# Patient Record
Sex: MALE | Race: WHITE | NOT HISPANIC OR LATINO | Employment: FULL TIME | URBAN - METROPOLITAN AREA
[De-identification: names, ages, dates, MRNs, and addresses within clinical notes are randomized per-mention and may not be internally consistent; named-entity substitution may affect disease eponyms.]

---

## 2020-10-01 ENCOUNTER — APPOINTMENT (OUTPATIENT)
Dept: RADIOLOGY | Facility: MEDICAL CENTER | Age: 50
End: 2020-10-01
Attending: EMERGENCY MEDICINE
Payer: COMMERCIAL

## 2020-10-01 ENCOUNTER — HOSPITAL ENCOUNTER (EMERGENCY)
Facility: MEDICAL CENTER | Age: 50
End: 2020-10-01
Attending: EMERGENCY MEDICINE
Payer: COMMERCIAL

## 2020-10-01 VITALS
BODY MASS INDEX: 23.4 KG/M2 | DIASTOLIC BLOOD PRESSURE: 85 MMHG | HEART RATE: 59 BPM | HEIGHT: 71 IN | TEMPERATURE: 99.1 F | OXYGEN SATURATION: 97 % | SYSTOLIC BLOOD PRESSURE: 134 MMHG | RESPIRATION RATE: 18 BRPM | WEIGHT: 167.11 LBS

## 2020-10-01 DIAGNOSIS — Z77.098 EXPOSURE TO CHEMICAL INHALATION: ICD-10-CM

## 2020-10-01 PROCEDURE — 71045 X-RAY EXAM CHEST 1 VIEW: CPT

## 2020-10-01 PROCEDURE — 99283 EMERGENCY DEPT VISIT LOW MDM: CPT

## 2020-10-01 NOTE — ED TRIAGE NOTES
Chief Complaint   Patient presents with   • Chemical Exposure     Pt reports to have been working [facility], chemicals were used inside of [employer] for cleaning and pt had exposure to chemicals used on roof through ventilations.    • Joint Pain     Pt/staff masked and in appropriate PPE during encounter.

## 2020-10-01 NOTE — ED PROVIDER NOTES
"ED Provider Note    Scribed for Lily Haque M.D. by Juan Gongora. 10/1/2020, 12:11 PM.    This patient was cared for during the COVID-19 pandemic. History and physical exam may be limited/truncated by the inherent challenges of PPE and the need to decrease staff exposure to novel coronavirus. Some aspects of disease management may be different to protect staff and help slow the spread of disease. I verified that, if possible, the patient was wearing a mask and I was wearing appropriate PPE every time I encountered the patient.     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    • Joint Pain       HPI  Moisés Cardenas is a 50 y.o. male who presents to the Emergency Department with mild left hip pain and possible chemical exposure that occurred 2 days ago. 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 While working, he noticed that members of his crew were becoming dizzy due to the chemicals being used by the cleaning team; he is not sure what the composition of the chemical was. The patient reports additional symptoms of a splitting headache and thick \"caramel colored\" nasal discharge, and denies skin rash. No exacerbating or alleviating factors were noted. He also denies history of hip problems, lung problems, or history of asthma. He does smoke.  Denies any current difficulty breathing or productive cough.  He denies any nausea or vomiting.  He denies any trouble moving his arms or legs.  He states several joints hurt but is not having any difficulty ambulating.  He denies any " "numbness or weakness down his left leg.  He has not had any trauma to his left hip.  Denies any back pain.    REVIEW OF SYSTEMS  Pertinent positives include mild left hip pain, chemical exposure, headache, nasal discharge. Pertinent negatives include no skin rash, numbness or tingling to his arms or legs, shortness of breath, hemoptysis, dizziness, syncope, falls.    PAST MEDICAL HISTORY   None pertinent    SURGICAL HISTORY  patient denies any surgical history    SOCIAL HISTORY  Social History     Tobacco Use   • Smoking status: Current Every Day Smoker     Packs/day: 0.25   • Smokeless tobacco: Never Used   Substance Use Topics   • Alcohol use: Yes     Comment: occas   • Drug use: Yes     Types: Inhaled      Social History     Substance and Sexual Activity   Drug Use Yes   • Types: Inhaled       FAMILY HISTORY  History reviewed. No pertinent family history.    CURRENT MEDICATIONS  No current outpatient medications    ALLERGIES  No Known Allergies    PHYSICAL EXAM  VITAL SIGNS: /85   Pulse 68   Temp 37.3 °C (99.1 °F) (Temporal)   Resp 18   Ht 1.803 m (5' 11\")   Wt 75.8 kg (167 lb 1.7 oz)   SpO2 93%   BMI 23.31 kg/m²     Constitutional:  Alert in no apparent distress.  HENT: Normocephalic, Bilateral external ears normal. Nose normal.   Eyes: Pupils are equal and reactive. Conjunctiva normal, non-icteric.   Thorax & Lungs: Easy unlabored respirations, clear to auscultation, no wheezing  Cardiovascular: Regular rate and rhythm  Abdomen:  No gross signs of peritonitis, no pain with movement   Skin: Visualized skin is  Dry, No erythema, No rash.   Extremities:   No edema, No asymmetry, normal range of motion of all joints  Neurologic: Alert, Grossly non-focal.   Psychiatric: Affect and Mood normal      DIAGNOSTIC STUDIES / PROCEDURES    RADIOLOGY  DX-CHEST-PORTABLE (1 VIEW)   Final Result      1.  There is no acute cardiopulmonary process.        The radiologist's interpretation of all radiological studies " have been reviewed by me.    COURSE & MEDICAL DECISION MAKING  Nursing notes, VS, PMSFHx reviewed in chart.    11:35 AM - Patient seen and examined at bedside. I told the patient I wanted to order a chest x-ray to check his lungs. I recommended that he not return to work until his symptoms subside.  Concerning his hip pain he has normal range of motion.  He is able to weight-bear without difficulty.  I do not suspect infection at this time.  He does not have any focal findings on neurologic exam.  He has good strength and sensation.  I don't feel he needs any imaging of this hip at this time.  Patient verbalizes understanding and agreement to this plan of care. Ordered DX-chest to evaluate his symptoms.     X-ray is returned and is unremarkable.  He is resting comfortably here.  See form has been filled out and he is cleared to return to work once his symptoms have resolved.    The patient will return for new or worsening symptoms and is stable at the time of discharge.    The patient is referred to a primary physician for blood pressure management, diabetic screening, and for all other preventative health concerns.      DISPOSITION:  Patient will be discharged home in stable condition.    FOLLOW UP:  [address]  [address]  [facility] [id number] [phone]  Schedule an appointment as soon as possible for a visit   If symptoms worsen, return to the er.        FINAL IMPRESSION  1. Exposure to chemical inhalation          Juan FLORES ([place]), am scribing for, and in the presence of, Lily Haque M.D..    Electronically signed by: Juan Gongora ([place]), 10/1/2020    Lily FLORES M.D. personally performed the services described in this documentation, as scribed by Juan Gongora in my presence, and it is both accurate and complete.    E    The note accurately reflects work and decisions made by me.  Lily Haque M.D.  10/1/2020  3:21 PM

## 2020-10-01 NOTE — DISCHARGE INSTRUCTIONS
Go home and rest.  Take Tylenol or ibuprofen for your joint pain.  Do not return until your symptoms have resolved.

## 2020-10-01 NOTE — LETTER
"  FORM C-4:  EMPLOYEE’S CLAIM FOR COMPENSATION/ REPORT OF INITIAL TREATMENT  EMPLOYEE’S CLAIM - PROVIDE ALL INFORMATION REQUESTED   First Name Moisés Last Name Ronald Birthdate 1970  Sex male Claim Number   Home Address [address]             [place] [address]                                   Age  50 y.o. Height  1.803 m (5' 11\") Weight  75.8 kg (167 lb 1.7 oz) [facility]     Mailing Address [address]              Zip [address] Telephone  [phone] (home)  Primary Language Spoken  English   Insurer   Third Party   [employer] [facility] Employee's Occupation (Job Title) When Injury or Occupational Disease Occurred     Employer's Name [employer] Telephone [phone]    Employer Address [address] [place] [address]   Date of Injury  9/29/2020       Hour of Injury  8:00 AM Date Employer Notified  9/29/2020 Last Day of Work after Injury or Occupational Disease  9/30/2020 Supervisor 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 (if applicable) Tearing of the roof    How did this injury or occupational disease occur? Be specific and answer in detail. 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 I do not know what they  used and they used no ventilation so all the vapors came up throug the roof vents  and gased us for hours before we realized they were there   If you believe that you have an occupational disease, when did you first have knowledge of the disability and it relationship to your employment? N/A Witnesses to the Accident  Marc Haider Trever, [place], Ron Schneider   Nature of Injury or Occupational Disease  Workers' Compensation " Part(s) of Body Injured or Affected  Skull, Abdomen Including Groin, Chest    I CERTIFY THAT THE ABOVE IS TRUE AND CORRECT TO THE BEST OF MY KNOWLEDGE AND THAT I HAVE PROVIDED THIS INFORMATION IN ORDER TO OBTAIN THE BENEFITS OF [place]  I HEREBY AUTHORIZE ANY PHYSICIAN, CHIROPRACTOR, SURGEON, PRACTITIONER, OR OTHER PERSON, ANY HOSPITAL, INCLUDING [facility] OR [facility], ANY MEDICAL SERVICE ORGANIZATION, ANY INSURANCE COMPANY, OR OTHER INSTITUTION OR ORGANIZATION TO RELEASE TO EACH OTHER, ANY MEDICAL OR OTHER INFORMATION, INCLUDING BENEFITS PAID OR PAYABLE, PERTINENT TO THIS INJURY OR DISEASE, EXCEPT INFORMATION RELATIVE TO DIAGNOSIS, TREATMENT AND/OR COUNSELING FOR AIDS, PSYCHOLOGICAL CONDITIONS, ALCOHOL OR CONTROLLED SUBSTANCES, FOR WHICH I MUST GIVE SPECIFIC AUTHORIZATION.  A PHOTOSTAT OF THIS AUTHORIZATION SHALL BE AS VALID AS THE ORIGINAL.  Date      10/01/2020                     Place           [facility]                                       Employee’s Signature   THIS REPORT MUST BE COMPLETED AND MAILED WITHIN 3 WORKING DAYS OF TREATMENT   Place [facility], EMERGENCY DEPT                       Name of Facility [facility]   Date  10/1/2020 Diagnosis  (Z77.098) Exposure to chemical inhalation Is there evidence the injured employee was under the influence of alcohol and/or another controlled substance at the time of accident?   Hour  1:12 PM Description of Injury or Disease  Exposure to chemical inhalation No   Treatment  rest  Have you advised the patient to remain off work five days or more?         No   X-Ray Findings  Negative If Yes   From Date    To Date      From information given by the employee, together with medical evidence, can you directly connect this injury or occupational disease as job incurred? Yes If No, is employee capable of:  "Full Duty  Yes Modified Duty      Is additional medical care by a physician indicated? Yes If Modified Duty, Specify any Limitations / Restrictions       Do you know of any previous injury or disease contributing to this condition or occupational disease? No    Date 10/1/2020 Print Doctor’s Name Lily Haque certify the employer’s copy of this form was mailed on:   Address [address] [place] [id number] [phone] INSURER’S USE ONLY   Provider’s Tax ID Number [id number] Telephone Dept: [phone]    Doctor’s Signature emmanuel-LILY Jimenez M.D. Degree M.D.      Form C-4 (rev.10/07)                                                                         BRIEF DESCRIPTION OF RIGHTS AND BENEFITS  (Pursuant to [place] 616[place].050)    Notice of Injury or Occupational Disease (Incident Report Form C-1): If an injury or occupational disease (OD) arises out of and in the course of employment, you must provide written notice to your employer as soon as practicable, but no later than 7 days after the accident or OD. Your employer shall maintain a sufficient supply of the required forms.    Claim for Compensation (Form C-4): If medical treatment is sought, the form C-4 is available at the place of initial treatment. A completed \"Claim for Compensation\" (Form C-4) must be filed within 90 days after an accident or OD. The treating physician or chiropractor must, within 3 working days after treatment, complete and mail to the employer, the employer's insurer and third-party , the Claim for Compensation.    Medical Treatment: If you require medical treatment for your on-the-job injury or OD, you may be required to select a physician or chiropractor from a list provided by your workers’ compensation insurer, if it has contracted with an Organization for Managed Care (MCO) or Preferred Provider Organization (PPO) or providers of health care. If your employer has not entered into a contract with an MCO or PPO, you may " select a physician or chiropractor from the Panel of Physicians and Chiropractors. Any medical costs related to your industrial injury or OD will be paid by your insurer.    Temporary Total Disability (TTD): If your doctor has certified that you are unable to work for a period of at least 5 consecutive days, or 5 cumulative days in a 20-day period, or places restrictions on you that your employer does not accommodate, you may be entitled to TTD compensation.    Temporary Partial Disability (TPD): If the wage you receive upon reemployment is less than the compensation for TTD to which you are entitled, the insurer may be required to pay you TPD compensation to make up the difference. TPD can only be paid for a maximum of 24 months.    Permanent Partial Disability (PPD): When your medical condition is stable and there is an indication of a PPD as a result of your injury or OD, within 30 days, your insurer must arrange for an evaluation by a rating physician or chiropractor to determine the degree of your PPD. The amount of your PPD award depends on the date of injury, the results of the PPD evaluation and your age and wage.    Permanent Total Disability (PTD): If you are medically certified by a treating physician or chiropractor as permanently and totally disabled and have been granted a PTD status by your insurer, you are entitled to receive monthly benefits not to exceed 66 2/3% of your average monthly wage. The amount of your PTD payments is subject to reduction if you previously received a PPD award.    Vocational Rehabilitation Services: You may be eligible for vocational rehabilitation services if you are unable to return to the job due to a permanent physical impairment or permanent restrictions as a result of your injury or occupational disease.    Transportation and Per Theodore Reimbursement: You may be eligible for travel expenses and per theodore associated with medical treatment.    Reopening: You may be able to  reopen your claim if your condition worsens after claim closure.     Appeal Process: If you disagree with a written determination issued by the insurer or the insurer does not respond to your request, you may appeal to the Department of Administration, , by following the instructions contained in your determination letter. You must appeal the determination within 70 days from the date of the determination letter at [address], [address], [place], [place] [address], or [address], [address], [place], [place] [address]. If you disagree with the  decision, you may appeal to the Department of Administration, . You must file your appeal within 30 days from the date of the  decision letter at [address], [address], [place], [place] [address], or [address] [facility] [address], [place], [place] [address]. If you disagree with a decision of an , you may file a petition for judicial review with the District Court. You must do so within 30 days of the Appeal Officer’s decision. You may be represented by an  at your own expense or you may contact the [facility] for possible representation.    [place]  for Injured Workers (NAIW): If you disagree with a  decision, you may request that NAIW represent you without charge at an  Hearing. For information regarding denial of benefits, you may contact the [facility] at: [address], [address], [place] [address], [phone], or [address], [place] [address], [phone]    To File a Complaint with the Division: If you wish to file a complaint with the  of the Division of Industrial Relations (DIR),  please contact the Workers’ Compensation Section, [address], [address], [place], [place] [address], telephone [phone]) [phone], or [address], [address], [place], [place] [address],  telephone [phone].    For assistance with Workers’ Compensation Issues: You may contact the Office of the Governor Consumer Health Assistance, [address] [place] [address], [address], [place] [address], Toll Free 1-559.279.5234, Web site: http://[employer].com.[facility].nv., E-mail [email or web address].  D-2 (rev. 06/18)              __________________________________________________________________                                    ___10/01/2020______________            Employee Name / Signature                                                                                                                            Date

## 2020-10-01 NOTE — LETTER
"  FORM C-4:  EMPLOYEE’S CLAIM FOR COMPENSATION/ REPORT OF INITIAL TREATMENT  EMPLOYEE’S CLAIM - PROVIDE ALL INFORMATION REQUESTED   First Name Moisés Last Name Ronald Birthdate 1970  Sex male Claim Number   Home Address [address] [facility]             Zip [address]                                   Age  50 y.o. Height  1.803 m (5' 11\") Weight  75.8 kg (167 lb 1.7 oz) [facility]  [id number]   Mailing Address [address]  [facility]              Zip [address] Telephone  [phone] (home)  Primary Language Spoken   Insurer  *** Third Party   [employer] Employee's Occupation (Job Title) When Injury or Occupational Disease Occurred     Employer's Name  Telephone     Employer Address  City  State  Zip    Date of Injury  9/29/2020       Hour of Injury  8:00 AM Date Employer Notified  9/29/2020 Last Day of Work after Injury or Occupational Disease  9/30/2020 Supervisor to Whom Injury Reported  Logan Purvis   Address or Location of Accident (if applicable) [[facility]]   What were you doing at the time of accident? (if applicable) Tearing of the roof    How did this injury or occupational disease occur? Be specific and answer in detail. 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 I do not know what they  used and they used no ventilation so all the vapors came up throug the roof vents  and gased us for hours before we realized they were there   If you believe that you have an occupational disease, when did you first have knowledge of the disability and it relationship to your employment? N/A Witnesses to the Accident  Marc Haider Trever, [place], Ron Schneider   Nature of Injury or Occupational Disease  Workers' Compensation Part(s) of Body Injured or Affected  Skull, Abdomen Including Groin, Chest    "   I CERTIFY THAT THE ABOVE IS TRUE AND CORRECT TO THE BEST OF MY KNOWLEDGE AND THAT I HAVE PROVIDED THIS INFORMATION IN ORDER TO OBTAIN THE BENEFITS OF [place]).  I HEREBY AUTHORIZE ANY PHYSICIAN, CHIROPRACTOR, SURGEON, PRACTITIONER, OR OTHER PERSON, ANY HOSPITAL, INCLUDING [facility] OR [facility], ANY MEDICAL SERVICE ORGANIZATION, ANY INSURANCE COMPANY, OR OTHER INSTITUTION OR ORGANIZATION TO RELEASE TO EACH OTHER, ANY MEDICAL OR OTHER INFORMATION, INCLUDING BENEFITS PAID OR PAYABLE, PERTINENT TO THIS INJURY OR DISEASE, EXCEPT INFORMATION RELATIVE TO DIAGNOSIS, TREATMENT AND/OR COUNSELING FOR AIDS, PSYCHOLOGICAL CONDITIONS, ALCOHOL OR CONTROLLED SUBSTANCES, FOR WHICH I MUST GIVE SPECIFIC AUTHORIZATION.  A PHOTOSTAT OF THIS AUTHORIZATION SHALL BE AS VALID AS THE ORIGINAL.  Date                                      Place                                                                             Employee’s Signature   THIS REPORT MUST BE COMPLETED AND MAILED WITHIN 3 WORKING DAYS OF TREATMENT   [facility], EMERGENCY DEPT                       [facility]   Date  10/1/2020 Diagnosis  (Z77.098) Exposure to chemical inhalation Is there evidence the injured employee was under the influence of alcohol and/or another controlled substance at the time of accident?   Hour  1:11 PM Description of Injury or Disease  Exposure to chemical inhalation No   Treatment  rest  Have you advised the patient to remain off work five days or more?         No   X-Ray Findings  Negative If Yes   From Date    To Date      From information given by the employee, together with medical evidence, can you directly connect this injury or occupational disease as job incurred? Yes If No, is employee capable of: Full Duty  Yes Modified Duty      Is additional medical  "care by a physician indicated? Yes If Modified Duty, Specify any Limitations / Restrictions       Do you know of any previous injury or disease contributing to this condition or occupational disease? No    Date 10/1/2020 Print Doctor’s Name HaqueLily troncoso SANDRA certify the employer’s copy of this form was mailed on:   Address [address] [place] [address]  [place] [id number] [phone] INSURER’S USE ONLY   Provider’s Tax ID Number [id number] Telephone Dept: [phone]    Doctor’s Signature LILY Cain M.D. Degree        Form C-4 (rev.10/07)                                                                         BRIEF DESCRIPTION OF RIGHTS AND BENEFITS  (Pursuant to [place] 616C.050)    Notice of Injury or Occupational Disease (Incident Report Form C-1): If an injury or occupational disease (OD) arises out of and in the course of employment, you must provide written notice to your employer as soon as practicable, but no later than 7 days after the accident or OD. Your employer shall maintain a sufficient supply of the required forms.    Claim for Compensation (Form C-4): If medical treatment is sought, the form C-4 is available at the place of initial treatment. A completed \"Claim for Compensation\" (Form C-4) must be filed within 90 days after an accident or OD. The treating physician or chiropractor must, within 3 working days after treatment, complete and mail to the employer, the employer's insurer and third-party , the Claim for Compensation.    Medical Treatment: If you require medical treatment for your on-the-job injury or OD, you may be required to select a physician or chiropractor from a list provided by your workers’ compensation insurer, if it has contracted with an Organization for Managed Care (MCO) or Preferred Provider Organization (PPO) or providers of health care. If your employer has not entered into a contract with an MCO or PPO, you may select a physician or chiropractor from the Panel of " Physicians and Chiropractors. Any medical costs related to your industrial injury or OD will be paid by your insurer.    Temporary Total Disability (TTD): If your doctor has certified that you are unable to work for a period of at least 5 consecutive days, or 5 cumulative days in a 20-day period, or places restrictions on you that your employer does not accommodate, you may be entitled to TTD compensation.    Temporary Partial Disability (TPD): If the wage you receive upon reemployment is less than the compensation for TTD to which you are entitled, the insurer may be required to pay you TPD compensation to make up the difference. TPD can only be paid for a maximum of 24 months.    Permanent Partial Disability (PPD): When your medical condition is stable and there is an indication of a PPD as a result of your injury or OD, within 30 days, your insurer must arrange for an evaluation by a rating physician or chiropractor to determine the degree of your PPD. The amount of your PPD award depends on the date of injury, the results of the PPD evaluation and your age and wage.    Permanent Total Disability (PTD): If you are medically certified by a treating physician or chiropractor as permanently and totally disabled and have been granted a PTD status by your insurer, you are entitled to receive monthly benefits not to exceed 66 2/3% of your average monthly wage. The amount of your PTD payments is subject to reduction if you previously received a PPD award.    Vocational Rehabilitation Services: You may be eligible for vocational rehabilitation services if you are unable to return to the job due to a permanent physical impairment or permanent restrictions as a result of your injury or occupational disease.    Transportation and Per Theodore Reimbursement: You may be eligible for travel expenses and per theodore associated with medical treatment.    Reopening: You may be able to reopen your claim if your condition worsens after  claim closure.     Appeal Process: If you disagree with a written determination issued by the insurer or the insurer does not respond to your request, you may appeal to the Department of Administration, , by following the instructions contained in your determination letter. You must appeal the determination within 70 days from the date of the determination letter at [address], [address], [place], [place] [address], or [address] [facility] [address], [address], [place], [place] [address]. If you disagree with the  decision, you may appeal to the Department of Administration, . You must file your appeal within 30 days from the date of the  decision letter at [address], [address], [place], [place] [address], or [address] [facility] [facility] [facility] [address], [place], [place] [address]. If you disagree with a decision of an , you may file a petition for judicial review with the District Court. You must do so within 30 days of the Appeal Officer’s decision. You may be represented by an  at your own expense or you may contact the [facility] for possible representation.    [place]  for Injured Workers (NAIW): If you disagree with a  decision, you may request that NAIW represent you without charge at an  Hearing. For information regarding denial of benefits, you may contact the [facility] at: [address], [address], [place] [address], [phone], or [address] [facility] [address] [facility] [address], [place] [address], [phone]    To File a Complaint with the Division: If you wish to file a complaint with the  of the Division of Industrial Relations (DIR),  please contact the Workers’ Compensation Section, [address] [facility], [place], [place] [address], telephone [phone], or [address], [place], [place] [address], telephone [phone].    For assistance with  Workers’ Compensation Issues: You may contact the Office of the Governor Consumer Health Assistance, [address] [place] [address], [facility] [address], [place] [address], Toll Free 1-695.666.4648, Web site: http://[email or web address]., E-mail [email or web address].  D-2 (rev. 06/18)              __________________________________________________________________                                    _________________            Employee Name / Signature                                                                                                                            Date

## 2022-08-18 ENCOUNTER — HOSPITAL ENCOUNTER (EMERGENCY)
Facility: MEDICAL CENTER | Age: 52
End: 2022-08-19
Attending: STUDENT IN AN ORGANIZED HEALTH CARE EDUCATION/TRAINING PROGRAM
Payer: COMMERCIAL

## 2022-08-18 ENCOUNTER — APPOINTMENT (OUTPATIENT)
Dept: RADIOLOGY | Facility: MEDICAL CENTER | Age: 52
End: 2022-08-18
Attending: STUDENT IN AN ORGANIZED HEALTH CARE EDUCATION/TRAINING PROGRAM
Payer: COMMERCIAL

## 2022-08-18 DIAGNOSIS — M54.2 NECK PAIN: ICD-10-CM

## 2022-08-18 DIAGNOSIS — R20.2 PARESTHESIA: ICD-10-CM

## 2022-08-18 DIAGNOSIS — M54.12 CERVICAL RADICULOPATHY: ICD-10-CM

## 2022-08-18 PROCEDURE — 700111 HCHG RX REV CODE 636 W/ 250 OVERRIDE (IP): Performed by: STUDENT IN AN ORGANIZED HEALTH CARE EDUCATION/TRAINING PROGRAM

## 2022-08-18 PROCEDURE — A9576 INJ PROHANCE MULTIPACK: HCPCS | Performed by: STUDENT IN AN ORGANIZED HEALTH CARE EDUCATION/TRAINING PROGRAM

## 2022-08-18 PROCEDURE — 700117 HCHG RX CONTRAST REV CODE 255: Performed by: STUDENT IN AN ORGANIZED HEALTH CARE EDUCATION/TRAINING PROGRAM

## 2022-08-18 PROCEDURE — 72156 MRI NECK SPINE W/O & W/DYE: CPT

## 2022-08-18 PROCEDURE — 96374 THER/PROPH/DIAG INJ IV PUSH: CPT

## 2022-08-18 PROCEDURE — 99284 EMERGENCY DEPT VISIT MOD MDM: CPT

## 2022-08-18 RX ORDER — LORAZEPAM 2 MG/ML
1 INJECTION INTRAMUSCULAR ONCE
Status: COMPLETED | OUTPATIENT
Start: 2022-08-18 | End: 2022-08-18

## 2022-08-18 RX ADMIN — LORAZEPAM 1 MG: 2 INJECTION INTRAMUSCULAR; INTRAVENOUS at 22:01

## 2022-08-18 RX ADMIN — GADOTERIDOL 15 ML: 279.3 INJECTION, SOLUTION INTRAVENOUS at 22:44

## 2022-08-19 VITALS
SYSTOLIC BLOOD PRESSURE: 123 MMHG | RESPIRATION RATE: 18 BRPM | WEIGHT: 158.95 LBS | OXYGEN SATURATION: 92 % | HEIGHT: 71 IN | TEMPERATURE: 97.8 F | DIASTOLIC BLOOD PRESSURE: 78 MMHG | HEART RATE: 61 BPM | BODY MASS INDEX: 22.25 KG/M2

## 2022-08-19 NOTE — ED TRIAGE NOTES
Moisés Cardenas  52 y.o. male  Chief Complaint   Patient presents with    Neck Pain     Pt was seen by Dr Tariq had an injection done to his neck today. Now has pain, numbness and increased pressure on the back of his neck and head     Patient states he has had extensive cervical surgeries, had a neck injection today and now he has pressure to back of his skull, numbness when he turns and looks left. Pt was unable to get hold of Dr Tariq office to inform them of the complications.     Vitals:    08/18/22 1818   BP: (!) 130/90   Pulse: 93   Resp: 18   Temp: 37.1 °C (98.7 °F)   SpO2: 98%       Triage process explained to patient, apologized for wait time, and returned to lobby.  Pt informed to notify staff of any change in condition.

## 2022-08-19 NOTE — DISCHARGE INSTRUCTIONS
Your MRI looked normal. Please call the office of Dr. Tariq tomorrow to obtain follow-up.  Return to the ER for weakness, numbness, fevers, loss of bladder or bowel control or numbness in the groin.     For pain, take Ibuprofen (Motrin, Advil) 600 mg up to every six hours  mg up to every eight hours if your stomach can take it. Instead of Ibuprofen, you can choose Naproxen (Aleve) and take 250-500 mg twice daily. Take Ibuprofen or Naproxen with food to lessen stomach irritation. You may also take Acetaminophen (Tylenol) 500mg (may take up to 1gm) every six hours in addition to Ibuprofen or Naproxen. (Maximum Tylenol 4 gm/day).  I suggest alternating between the ibuprofen and Tylenol every 4 hours for optimal pain relief and adequate spacing of each medication.

## 2022-08-19 NOTE — ED PROVIDER NOTES
"CHIEF COMPLAINT  Chief Complaint   Patient presents with    Neck Pain     Pt was seen by Dr Tariq had an injection done to his neck today. Now has pain, numbness and increased pressure on the back of his neck and head       HPI  Moisés Cardenas is a 52 y.o. male who presents for evaluation of moderate to severe neck pain onset today. Moisés has previously had cervical fixation about 10 years ago. Over the past four years he developed worsening neck pain and left arm paresthesias. Earlier today, Moisés was seen by Dr. Tariq and received an injection in his neck for his chronic pain. While he was getting the injection he states that it was \"extremely painful\". He does not know the nature of the injection. This was the first time he ever received an injection there. Around 4:30 PM Moisés develop a moderate burning pain that radiates down his left neck. He states his pain was about a 8/10.  Associated paresthesias are worse looking straight forward or turning to his left. He has a pressure on the top his head that radiates down his neck, but he denies any headaches. He has associated symptoms of mild photophobia and paresthesia down his left arm. Right now his numbness and pain is about a 3/10 in severity. He denies bowel or urine incontinence, pain or numbness in his groin, nausea, vomiting, fever, or chills. No alleviating factors were reported. He admits to smoking but denies alcohol or drug use.     Pain: severe  Location: left lateral neck  Quality: burning, throbbing sensation  Exacerbating or alleviating factors: exacerbated when looking to the left, no alleviating factors  Associated symptoms: paresthesia down his left arm  The patient states that there were no other associated signs and symptoms or modifying factors.    REVIEW OF SYSTEMS  As per HPI, otherwise a 10 point review of systems is negative    PAST MEDICAL HISTORY  Chronic neck pain    SOCIAL HISTORY  Social History     Tobacco Use    Smoking status: Every " "Day     Packs/day: 0.25     Types: Cigarettes    Smokeless tobacco: Never   Substance Use Topics    Alcohol use: Yes     Comment: occas    Drug use: Yes     Types: Inhaled       SURGICAL HISTORY  Unspecified neck surgery    CURRENT MEDICATIONS  Home Medications       Reviewed by Brittanie Chandler R.N. (Registered Nurse) on 08/18/22 at 1832  Med List Status: Not Addressed     Medication Last Dose Status        Patient Bakari Taking any Medications                           ALLERGIES  No Known Allergies    PHYSICAL EXAM  VITAL SIGNS: BP (!) 130/90   Pulse 93   Temp 37.1 °C (98.7 °F) (Temporal)   Resp 18   Ht 1.803 m (5' 11\")   Wt 72.1 kg (158 lb 15.2 oz)   SpO2 98%   BMI 22.17 kg/m²    Constitutional: Awake and alert . No distress  HENT: Normal inspection  Eyes: Normal inspection  Neck: Grossly normal range of motion.  Cardiovascular: Normal heart rate, Normal rhythm.  Symmetric peripheral pulses.   Thorax & Lungs: No respiratory distress, No wheezing, No rales, No rhonchi, No chest tenderness.   Abdomen: Soft, non-distended, nontender, no mass  Skin: No obvious rash.  Back: No tenderness, No CVA tenderness.   Extremities: Warm, well perfused. No clubbing, cyanosis, edema,   Neurologic: CN II-XII tested and intact.   Reports diminished sensation to light touch to left upper extremity compared to right though sensation is objectively intact  Motor: Normal tone and bulk. No abnormal movements appreciated. No pronator drift. Strength tested and 5/5 in bilateral wrist flexion/extension, elbow flexion/extension, shoulder abduction, straight leg raise, knee flexion/extension, ankle dorsiflexion/plantarflexion. Patient ambulates with a steady gait.  Coordination: Finger to nose and heel to shin testing intact bilaterally.  Psychiatric: Normal for situation      RADIOLOGY/PROCEDURES  MR-CERVICAL SPINE-WITH & W/O    (Results Pending)    Imaging is interpreted by radiologist    Medications   LORazepam (ATIVAN) injection 1 mg " (1 mg Intravenous Given 8/18/22 2201)   gadoteridol (PROHANCE) injection 15 mL (15 mL Intravenous Given 8/18/22 0386)       COURSE & MEDICAL DECISION MAKING  9:00 PM Patient was evaluated at bedside. Ordered for an MRI-Cervical Spine to evaluate his symptoms. I informed the patient of my plan to run diagnostic studies to evaluate their symptoms. Patient verbalizes understanding and support with my plan of care.     9:55 PM Per nurse patient appears claustrophobic and anxious about the CT scan. Patient will be treated with Ativan 1 mg     11:41 PM MRI prelim read normal    11:50 I discussed the patient's case and the above findings with Dr. Mcgill (Neurosurgery) who agrees with outpatient management and has no further recommendations for work-up in the emergency department.      This is a 52-year-old with a remote history of cervical spine fusion who presents with paresthesias to the left arm after receiving epidural injection earlier this evening.  He arrives with normal vital signs and is afebrile and overall well-appearing.  He does not have any objective neurological deficit on exam though reports subjective paresthesias to the entire left upper extremity.  He has intact coordination and good strength.  He denies any urinary retention, incontinence, perineal numbness or bowel incontinence to suggest cauda quina or spinal cord compression.   Given his recent injection and worsening symptoms plan for MRI to evaluate for epidural hematoma or other pathology.  He denies any trauma to suggest an acute fracture or subluxation.  Epidural abscess is unlikely given the onset of symptoms only about 1 hour after the injection.  MRI obtained which does not show any evidence of hematoma or other acute etiology to explain his symptoms.  Dr Mcgill agrees with work-up and outpatient management.  He was advised to call the office of Dr. Tariq tomorrow morning in order to arrange close follow-up.  He continues to look well with  normal vital signs and feel that he can be safely discharged.     The patient will return for new or worsening symptoms and is stable at the time of discharge.The patient is referred to a primary physician for blood pressure management, diabetic screening, and for all other preventative health concerns.    DISPOSITION:  Patient will be discharged home in stable condition.    FOLLOW UP:  Chris Tariq M.D.  [address]  [facility] [address]  [phone]    Schedule an appointment as soon as possible for a visit in 1 day      [facility], Emergency Dept  [address] [facility] [address] [phone]    If symptoms worsen      FINAL IMPRESSION  1. Neck pain Acute       2. Paresthesia Acute       3. Cervical radiculopathy Acute           This dictation was created using voice recognition software. The accuracy of the dictation is limited to the abilities of the software.  The nursing notes were reviewed and certain aspects of this information were incorporated into this note.    Sheri FLORES (Brook), am scribing for, and in the presence of, Anum Sung M.D..    Electronically signed by: Sheri Gil (Brook), 8/18/2022    Anum FLORES M.D. personally performed the services described in this documentation, as scribed by Sheri Gil in my presence, and it is both accurate and complete.    The note accurately reflects work and decisions made by me.  Anum Sung M.D.  8/19/2022  3:25 AM

## 2022-08-19 NOTE — ED NOTES
Break RN: Pt ambulated to RED 3 from lobby with steady gait.  On monitor, call light in reach. Chart up for ERP.    Pt reports 10 years ago a cervical vertebra was forward and placing pressure on his trachea causing the initial cervical surgeries, pt states he feels like this is beginning to happen again. Pt is maintaining his own airway and secretions. Pt does not have any increased WOB

## 2024-06-18 ENCOUNTER — OFFICE VISIT (OUTPATIENT)
Dept: URGENT CARE | Facility: PHYSICIAN GROUP | Age: 54
End: 2024-06-18

## 2024-06-18 VITALS
HEIGHT: 71 IN | HEART RATE: 68 BPM | DIASTOLIC BLOOD PRESSURE: 76 MMHG | TEMPERATURE: 96.9 F | WEIGHT: 149.91 LBS | OXYGEN SATURATION: 98 % | SYSTOLIC BLOOD PRESSURE: 116 MMHG | RESPIRATION RATE: 20 BRPM | BODY MASS INDEX: 20.99 KG/M2

## 2024-06-18 DIAGNOSIS — R01.1 HEART MURMUR: ICD-10-CM

## 2024-06-18 DIAGNOSIS — Z02.4 ENCOUNTER FOR COMMERCIAL DRIVER MEDICAL EXAMINATION (CDME): ICD-10-CM

## 2024-06-18 PROCEDURE — 3078F DIAST BP <80 MM HG: CPT | Performed by: STUDENT IN AN ORGANIZED HEALTH CARE EDUCATION/TRAINING PROGRAM

## 2024-06-18 PROCEDURE — 3074F SYST BP LT 130 MM HG: CPT | Performed by: STUDENT IN AN ORGANIZED HEALTH CARE EDUCATION/TRAINING PROGRAM

## 2024-06-18 PROCEDURE — 7100 PR DOT PHYSICAL: Performed by: STUDENT IN AN ORGANIZED HEALTH CARE EDUCATION/TRAINING PROGRAM

## 2024-06-18 NOTE — PROGRESS NOTES
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  No known previous history of heart murmurs.  No history of exertional chest pain or CAD.     reviewed without any discrepancies    Ordered referral to follow-up with cardiology    See scanned in documentation for full details    Recertification in 2 years

## 2024-07-01 ENCOUNTER — TELEPHONE (OUTPATIENT)
Dept: HEALTH INFORMATION MANAGEMENT | Facility: OTHER | Age: 54
End: 2024-07-01

## 2024-09-13 ENCOUNTER — HOSPITAL ENCOUNTER (OUTPATIENT)
Dept: RADIOLOGY | Facility: MEDICAL CENTER | Age: 54
End: 2024-09-13

## 2024-09-13 ENCOUNTER — NON-PROVIDER VISIT (OUTPATIENT)
Dept: URGENT CARE | Facility: PHYSICIAN GROUP | Age: 54
End: 2024-09-13
Payer: COMMERCIAL

## 2024-09-13 ENCOUNTER — OCCUPATIONAL MEDICINE (OUTPATIENT)
Dept: URGENT CARE | Facility: PHYSICIAN GROUP | Age: 54
End: 2024-09-13
Payer: COMMERCIAL

## 2024-09-13 VITALS
TEMPERATURE: 97.6 F | HEIGHT: 71 IN | DIASTOLIC BLOOD PRESSURE: 74 MMHG | BODY MASS INDEX: 23.8 KG/M2 | SYSTOLIC BLOOD PRESSURE: 136 MMHG | WEIGHT: 170 LBS | HEART RATE: 81 BPM | RESPIRATION RATE: 17 BRPM | OXYGEN SATURATION: 97 %

## 2024-09-13 DIAGNOSIS — Z02.83 ENCOUNTER FOR DRUG SCREENING: ICD-10-CM

## 2024-09-13 DIAGNOSIS — S29.9XXA RIB INJURY: ICD-10-CM

## 2024-09-13 DIAGNOSIS — S22.42XA CLOSED FRACTURE OF MULTIPLE RIBS OF LEFT SIDE, INITIAL ENCOUNTER: ICD-10-CM
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
## 2024-09-13 LAB
BREATH ALCOHOL COMMENT: NORMAL
POC BREATHALIZER: 0 PERCENT (ref 0–0.01)

## 2024-09-13 PROCEDURE — 80305 DRUG TEST PRSMV DIR OPT OBS: CPT

## 2024-09-13 PROCEDURE — 71046 X-RAY EXAM CHEST 2 VIEWS: CPT

## 2024-09-13 PROCEDURE — 99214 OFFICE O/P EST MOD 30 MIN: CPT

## 2024-09-13 PROCEDURE — 3078F DIAST BP <80 MM HG: CPT

## 2024-09-13 PROCEDURE — 82075 ASSAY OF BREATH ETHANOL: CPT

## 2024-09-13 PROCEDURE — 3075F SYST BP GE 130 - 139MM HG: CPT

## 2024-09-13 RX ORDER — CYCLOBENZAPRINE HCL 5 MG
5 TABLET ORAL NIGHTLY PRN
Qty: 10 TABLET | Refills: 0 | Status: SHIPPED | OUTPATIENT
Start: 2024-09-13 | End: 2024-09-27

## 2024-09-13 RX ORDER — ONDANSETRON 4 MG/1
4 TABLET, ORALLY DISINTEGRATING ORAL EVERY 6 HOURS PRN
COMMUNITY

## 2024-09-13 RX ORDER — DOCUSATE SODIUM 100 MG/1
100 CAPSULE, LIQUID FILLED ORAL 2 TIMES DAILY
COMMUNITY

## 2024-09-13 RX ORDER — OXYCODONE HYDROCHLORIDE 5 MG/1
5 TABLET ORAL EVERY 4 HOURS PRN
COMMUNITY
End: 2024-09-13 | Stop reason: SDUPTHER

## 2024-09-13 RX ORDER — OXYCODONE HYDROCHLORIDE 5 MG/1
5 TABLET ORAL EVERY 6 HOURS PRN
Qty: 20 TABLET | Refills: 0 | Status: SHIPPED | OUTPATIENT
Start: 2024-09-13 | End: 2024-09-18

## 2024-09-13 ASSESSMENT — VISUAL ACUITY: OU: 1

## 2024-09-13 NOTE — LETTER
"    EMPLOYEE’S CLAIM FOR COMPENSATION/ REPORT OF INITIAL TREATMENT  FORM C-4  PLEASE TYPE OR PRINT    EMPLOYEE’S CLAIM - PROVIDE ALL INFORMATION REQUESTED   First Name                    BASHIR Curiel                  Last Name  Ronald Birthdate                    1970                Sex  Male Claim Number (Insurer’s Use Only)     Home Address  [address] Age  54 y.o. Height  1.803 m (5' 11\") Weight  77.1 kg (170 lb) Social Security Number     [facility] Zip  [address] Telephone  [phone] (home) [phone] (work)   Mailing Address  [address] [facility] Zip  [address] Primary Language Spoken  English    INSURER   THIRD-PARTY   Misc Workers Comp   Employee's Occupation (Job Title) When Injury or Occupational Disease Occurred  [employer]    Employer's Name/Company Name     Telephone      Office Mail Address (Number and Street)  [address]     Date of Injury (if applicable) 9/11/2024               Hours Injury (if applicable)  5:20 AM Date Employer Notified  9/11/2024 Last Day of Work after Injury or Occupational Disease  9/11/2024 Supervisor to Whom Injury Reported  [facility]   Address or Location of Accident (if applicable)  Work [1]   What were you doing at the time of accident? (if applicable)  Climbing out of the truck    How did this injury or occupational disease occur? (Be specific and answer in detail. Use additional sheet if necessary)  Stepping backwards out of the truck, the door moved so my hand slipped off then my foot slipped off the step. I fell backwards and hit the the step of the next truck over.   If you believe that you have an occupational disease, when did you first have knowledge of the disability and its relationship to your employment?  N/A Witnesses to the Accident (if applicable)  N/A      Nature of Injury or " Occupational Disease  Workers' Compensation  Part(s) of Body Injured or Affected  Chest N/A N/A    I CERTIFY THAT THE ABOVE IS TRUE AND CORRECT TO T HE BEST OF MY KNOWLEDGE AND THAT I HAVE PROVIDED THIS INFORMATION IN ORDER TO OBTAIN THE BENEFITS OF [place], OR [place]  I HEREBY AUTHORIZE ANY PHYSICIAN, CHIROPRACTOR, SURGEON, PRACTITIONER OR ANY OTHER PERSON, ANY HOSPITAL, INCLUDING [facility] OR [facility], ANY  MEDICAL SERVICE ORGANIZATION, ANY INSURANCE COMPANY, OR OTHER INSTITUTION OR ORGANIZATION TO RELEASE TO EACH OTHER, ANY MEDICAL OR OTHER INFORMATION, INCLUDING BENEFITS PAID OR PAYABLE, PERTINENT TO THIS INJURY OR DISEASE, EXCEPT INFORMATION RELATIVE TO DIAGNOSIS, TREATMENT AND/OR COUNSELING FOR AIDS, PSYCHOLOGICAL CONDITIONS, ALCOHOL OR CONTROLLED SUBSTANCES, FOR WHICH I MUST GIVE SPECIFIC AUTHORIZATION.  A PHOTOSTAT OF THIS AUTHORIZATION SHALL BE VALID AS THE ORIGINAL.     Date   Place Employee’s Original or  *Electronic Signature   THIS REPORT MUST BE COMPLETED AND MAILED WITHIN 3 WORKING DAYS OF TREATMENT   Place  [facility]    Name of Facility  [facility]   Date 9/13/2024 Diagnosis and Description of Injury or Occupational Disease  (Y99.0) Work related injury  (S29.9XXA) Rib injury  Diagnoses of Work related injury and Rib injury were pertinent to this visit. Is there evidence that the injured employee was under the influence of alcohol and/or another controlled substance at the time of accident?  [x]No  [] Yes (if yes, please explain)   Hour 10:34 AM      Treatment:  Oxycodone 5mg Q6h PRN PO     Flexeril 5mg PO nightly PRN     Tylenol     Totally temporarily disabled     Follow up 7 days     Referral to Occupational Medicine     Have you advised the patient to remain off work five days or more?   [] Yes Indicate dates: From   To    [x] No      If no, is the injured employee  capable of: [] full duty [x] modified duty                     If modified duty, specify any limitations / restrictions:    See D39 for restrictions                                                                                                                                                                                                                                                                                                                                                                                                             X-Ray Findings:  No pneumothorax     From information given by the employee, together with medical evidence, can you directly connect this injury or occupational disease as job incurred?  [x]Yes   [] No      Is additional medical care by a physician indicated? [x]Yes [] No       Do you know of any previous injury or disease contributing to this condition or occupational disease? []Yes [x] No (Explain if yes)                              Date  9/13/2024 Print Health Care Provider’s Name  YARON Escobar. I certify that the employer’s copy of  this form was delivered to the employer on:   Address  [address] [place] [address]. INSURER'S USE ONLY                       [facility]  [id number] Provider’s Tax ID Number  [id number]   Telephone  Dept: [phone]    Health Care Provider’s Original or Electronic Signature   Darwin Hermosillo  Degree (MD,DO, DC,PA-C,APRN)  APRN  Choose (if applicable)      ORIGINAL - TREATING HEALTHCARE PROVIDER PAGE 2 - INSURER/TPA PAGE 3 - EMPLOYER PAGE 4 - EMPLOYEE             Form C-4 (rev.08/23)
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
## 2024-09-13 NOTE — PROGRESS NOTES
"Subjective:     Moisés Cardenas is a 54 y.o. male who presents for Workers Comp left rib injuries.     HPI:   DOI: 9/11/24. JONAS: Patient reports he was stepping out of his truck and fell backward, striking his left side while at work. Patient presented to the emergency department following the injury at an outlying facility where a chest CT was performed, demonstrating minimally displaced left 7th-9th rib fractures. No pneumothorax or pleural effusion on imaging. He was prescribed oxycodone for relief. Pain is located overlying the left lateral rib cage. Pain is worse with deep inspiration, coughing, laughing, and movement. Denies chest pain. No shortness of breath or increased work of breathing. Denies any pain in other regions of the body. Denies bruising to chest or abdomen. Mild swelling of left lateral intercostal region. Reports full ROM of BUE, no numbness/tingling or sensation loss in upper extremities. No cardiac history. No hemoptysis. Denies abdominal pain, no nausea/vomiting/diarrhea. Denies fever. No dizziness or visual changes. Denies palpitations.           PMH:   No pertinent past medical history to this problem  MEDS:  Medications were reviewed in EMR  ALLERGIES:  Allergies were reviewed in EMR   FH:   No pertinent family history to this problem     Objective:     /74   Pulse 81   Temp 36.4 °C (97.6 °F) (Temporal)   Resp 17   Ht 1.803 m (5' 11\")   Wt 77.1 kg (170 lb)   SpO2 97%   BMI 23.71 kg/m²     Physical Exam  Vitals reviewed.   Constitutional:       General: He is not in acute distress.  HENT:      Nose: Nose normal.   Eyes:      General: Vision grossly intact. Gaze aligned appropriately. No visual field deficit.     Extraocular Movements: Extraocular movements intact.      Conjunctiva/sclera: Conjunctivae normal.      Pupils: Pupils are equal, round, and reactive to light.   Neck:      Trachea: Trachea normal. No abnormal tracheal secretions or tracheal deviation. "   Cardiovascular:      Rate and Rhythm: Normal rate and regular rhythm.      Pulses: Normal pulses.           Radial pulses are 2+ on the right side and 2+ on the left side.      Heart sounds: Normal heart sounds.   Pulmonary:      Effort: Pulmonary effort is normal. No tachypnea, accessory muscle usage, prolonged expiration, respiratory distress or retractions.      Breath sounds: Normal breath sounds. No stridor, decreased air movement or transmitted upper airway sounds. No wheezing, rhonchi or rales.      Comments: No crepitus on pulmonary examination. Lung sounds present and clear throughout all lobes.       Chest:      Chest wall: Swelling and tenderness present. No mass, lacerations, deformity or crepitus.   Breasts:     Left: No tenderness.      Comments: Reproducible tenderness to palpation to left lateral intercostal region.    Strength 5/5 to bilateral upper extremities.   No midline spinal tenderness.   No ecchymosis present.   Neurovascular intact. Peripheral pulses without abnormality or indication of diminished blood flow.   Abdominal:      Comments: No mike's or grey roberts sign     Musculoskeletal:         General: Normal range of motion.      Right shoulder: No swelling, deformity, effusion, tenderness, bony tenderness or crepitus. Normal range of motion. Normal strength. Normal pulse.      Left shoulder: No swelling, deformity, effusion, tenderness, bony tenderness or crepitus. Normal range of motion. Normal strength. Normal pulse.      Right upper arm: No swelling, deformity, tenderness or bony tenderness.      Left upper arm: No swelling, deformity, tenderness or bony tenderness.      Cervical back: Full passive range of motion without pain, normal range of motion and neck supple. No swelling, deformity, erythema, rigidity, spasms, tenderness, bony tenderness or crepitus. No pain with movement, spinous process tenderness or muscular tenderness. Normal range of motion.      Thoracic back: No  swelling, deformity, spasms, tenderness or bony tenderness. Normal range of motion.      Lumbar back: No swelling, deformity, spasms, tenderness or bony tenderness. Normal range of motion.      Right lower leg: No edema.      Left lower leg: No edema.   Skin:     General: Skin is warm and dry.      Capillary Refill: Capillary refill takes less than 2 seconds.      Findings: No ecchymosis, erythema, rash or wound.   Neurological:      Mental Status: He is alert. Mental status is at baseline.   Psychiatric:         Mood and Affect: Mood normal.         Behavior: Behavior normal.         Thought Content: Thought content normal.        DX-CHEST-2 VIEWS    Result Date: 9/13/2024 9/13/2024 11:32 AM HISTORY/REASON FOR EXAM:  Chest pain, rib pain TECHNIQUE/EXAM DESCRIPTION AND NUMBER OF VIEWS: Two views of the chest. COMPARISON:  10/1/2020. FINDINGS: LUNGS: Areas of linear atelectasis/scarring in the lower lungs. No focal consolidation. No effusions. PNEUMOTHORAX: None. LINES AND TUBES: None. MEDIASTINUM: No cardiomegaly. MUSCULOSKELETAL STRUCTURES: No acute displaced fracture. ACDF.     Areas of linear atelectasis/scarring in the lower lungs. No focal consolidation. No effusions.       Assessment/Plan:     1. Work related injury     2. Rib injury   - DX-CHEST-2 VIEWS; Future  - oxyCODONE immediate-release (ROXICODONE) 5 MG Tab; Take 1 Tablet by mouth every 6 hours as needed for Severe Pain for up to 5 days.  Dispense: 20 Tablet; Refill: 0  - cyclobenzaprine (FLEXERIL) 5 mg tablet; Take 1 Tablet by mouth at bedtime as needed for Moderate Pain or Muscle Spasms.  Dispense: 10 Tablet; Refill: 0    3. Closed fracture of multiple ribs of left side, initial encounter   - Referral to Occupational Medicine  - oxyCODONE immediate-release (ROXICODONE) 5 MG Tab; Take 1 Tablet by mouth every 6 hours as needed for Severe Pain for up to 5 days.  Dispense: 20 Tablet; Refill: 0  - cyclobenzaprine (FLEXERIL) 5 mg tablet; Take 1 Tablet by  mouth at bedtime as needed for Moderate Pain or Muscle Spasms.  Dispense: 10 Tablet; Refill: 0     Released to TOTALLY TEMPORARILY DISABLED Duty FROM 9/13/24 TO 9/20/24.   Treatment of as above and initial rest with no heavy lifting, stooping, or strenuous activity. Massage, ice and/or heat which ever feels better, and Tylenol per manufacture's instructions. Encouraged walking, stretching, and range of motion exercises as tolerated. Avoid sitting or laying down for long periods of time except for at night during sleep.   Incentive spirometry.   Treatment of cyclobenzaprine - Discussed with patient this medication can cause drowsiness/sedation. The patient was instructed to use medication mostly during the evening/night time. Instructed to avoid driving, operating machinery, or drinking alcohol while using this medication.   Patient is overall very well-appearing, no acute distress, and normal vital signs. Suspicions for acute emergent pathology are low.   Refill of Oxycodone 5mg prescribed. Risk of respiratory depression discussed with use of Oxycodone and Cyclobenzaprine. Patient verbalizes understanding. Controlled substance form signed and scanned into chart. PDMP reviewed.     In prescribing controlled substances to this patient, I certify that I have obtained and reviewed the medical history of Moisés DORMANRamiro Zaratekaryna. I have also made a good henok effort to obtain applicable records from other providers who have treated the patient and records did not demonstrate any increased risk of substance abuse that would prevent me from prescribing controlled substances.      I have conducted a physical exam and documented it. I have reviewed Mr. Kim’s prescription history as maintained by the [place] Prescription Monitoring Program.      I have assessed the patient’s risk for abuse, dependency, and addiction using the validated Opioid Risk Tool available at https://www.mdcalc.com/nkkgjp-rzkx-ppih-ort-narcotic-abuse.       Given the above, I believe the benefits of controlled substance therapy outweigh the risks. The reasons for prescribing controlled substances include non-narcotic, oral analgesic alternatives have been inadequate for pain control. Accordingly, I have discussed the risk and benefits, treatment plan, and alternative therapies with the patient.      Patient understands to only take the pain medication sparingly as prescribed and not before driving or working and not in any conjunction with alcohol or other sedating medications.  He understands potential drowsiness side effects.  If pain is uncontrolled with this medication he understands that he should go into the emergency room.  Controlled substance agreement signed by patient and scanned into chart today.       MDM/Comments:   Patient is overall very well-appearing, no acute distress, and normal vital signs. Suspicions for acute emergent pathology are low. Pulmonary exam without abnormality. Full ROM in all extremities. CT imaging obtained 9/11/24 at outlying facility of ribs and chest demonstrates minimally displaced LEFT lateral 7th, 8th, and 9th rib fracture with no associated pleural fluid or pneumothorax. Supportive therapy encouraged.     Differential diagnosis, natural history, supportive care, and indications for immediate follow-up discussed.      Illness progression and alarm symptoms discussed with patient, emphasizing low threshold for returning to clinic/emergency department for worsening symptoms or shortness of breath/difficulty breathing. Patient is agreeable to the plan and verbalizes understanding, and will follow up if warranted.       Follow-up as needed if symptoms worsen or fail to improve to PCP, Urgent care or Emergency Room.                         Electronically signed by BECKY Cummins

## 2024-09-13 NOTE — LETTER
PHYSICIAN’S AND CHIROPRACTIC PHYSICIAN'S   PROGRESS REPORT CERTIFICATION OF DISABILITY Claim Number:     Social Security Number:    Patient’s Name: Moisés Cardenas Date of Injury: 9/11/2024   Employer:   Name of MCO (if applicable):      Patient’s Job Description/Occupation: [employer]       Previous Injuries/Diseases/Surgeries Contributing to the Condition:   None       Diagnosis: (Y99.0) Work related injury  (S29.9XXA) Rib injury      Related to the Industrial Injury?   Yes     Explain:  Patient fell while getting out of a truck while at work       Objective Medical Findings:  Reproducible tenderness to palpation to left lateral intercostal region.    Strength 5/5 to bilateral upper extremities.   No midline spinal tenderness.   No ecchymosis present.   Neurovascular intact. Peripheral pulses without abnormality or indication of diminished blood flow.   No crepitus.          None - Discharged                        X Stable                    Ratable           Generally Improved                         Condition Worsened                  Condition Same  May Have Suffered a Permanent Disability  No      Treatment Plan:     Oxycodone Q6h PRN PO          Flexeril 5mg nightly PRN PO          Tylenol          Work restrictions          No Change in Therapy                  PT/OT Prescribed                      Medication May be Used While Working        Case Management                          PT/OT Discontinued    Consultation    Further Diagnostic Studies:    Prescription(s)  DX-Chest-2 Views  - negative for pneumothorax   Oxycodone 5mg PO Q6h PRN   Cyclobenzaprine 5mg PO nightly PRN                Released to FULL DUTY /No Restrictions on (Date):  From:      Certified TOTALLY TEMPORARILY DISABLED (Indicate Dates) From: 9/13/24  To:  9/20/24     Released to RESTRICTED/Modified Duty on (Date): From:   To:    Restrictions Are:         No Sitting    No Standing    No Pulling Other:         No Bending at  Waist     No Stooping     No Lifting        No Carrying     No Walking Lifting Restricted to (lbs.):          No Pushing        No Climbing     No Reaching Above Shoulders       Date of Next Visit:   9/20/24  Date of this Exam: 9/13/2024 Physician/Chiropractic Physician Name: HENNA Escobar Physician/Chiropractic Physician Signature:  Brian Garcia DO MPH                                                                                                                                                                                                            D-39 (Rev. 2/24)

## 2024-09-20 ENCOUNTER — OCCUPATIONAL MEDICINE (OUTPATIENT)
Dept: URGENT CARE | Facility: PHYSICIAN GROUP | Age: 54
End: 2024-09-20
Payer: COMMERCIAL

## 2024-09-20 VITALS
TEMPERATURE: 98.3 F | WEIGHT: 174.6 LBS | SYSTOLIC BLOOD PRESSURE: 116 MMHG | HEIGHT: 71 IN | DIASTOLIC BLOOD PRESSURE: 70 MMHG | OXYGEN SATURATION: 97 % | RESPIRATION RATE: 18 BRPM | HEART RATE: 77 BPM | BODY MASS INDEX: 24.44 KG/M2
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
## 2024-09-20 DIAGNOSIS — S22.42XD CLOSED FRACTURE OF MULTIPLE RIBS OF LEFT SIDE WITH ROUTINE HEALING, SUBSEQUENT ENCOUNTER: ICD-10-CM

## 2024-09-20 PROCEDURE — 99214 OFFICE O/P EST MOD 30 MIN: CPT | Performed by: STUDENT IN AN ORGANIZED HEALTH CARE EDUCATION/TRAINING PROGRAM

## 2024-09-20 PROCEDURE — 3074F SYST BP LT 130 MM HG: CPT | Performed by: STUDENT IN AN ORGANIZED HEALTH CARE EDUCATION/TRAINING PROGRAM

## 2024-09-20 PROCEDURE — 3078F DIAST BP <80 MM HG: CPT | Performed by: STUDENT IN AN ORGANIZED HEALTH CARE EDUCATION/TRAINING PROGRAM

## 2024-09-20 RX ORDER — OXYCODONE HYDROCHLORIDE 5 MG/1
5 TABLET ORAL EVERY 6 HOURS PRN
Qty: 10 TABLET | Refills: 0 | Status: SHIPPED | OUTPATIENT
Start: 2024-09-20 | End: 2024-09-23

## 2024-09-20 NOTE — PROGRESS NOTES
"Subjective:     Moisés Cardenas is a 54 y.o. male who presents for Follow-Up (W/C/Feeling better, still has pain in ribs)      Patient is here for reevaluation of work-related injury after sustaining a fall 1 week ago.  Has 3 slightly displaced rib fractures on the left side.  Still experiencing pain but overall is having some improvement.  No shortness of breath.  Has reduced pain medication.    PMH:   No pertinent past medical history to this problem  MEDS:  Medications were reviewed in EMR  ALLERGIES:  Allergies were reviewed in EMR  FH:   No pertinent family history to this problem       Objective:     /70   Pulse 77   Temp 36.8 °C (98.3 °F) (Temporal)   Resp 18   Ht 1.803 m (5' 11\")   Wt 79.2 kg (174 lb 9.6 oz)   SpO2 97%   BMI 24.35 kg/m²     Tenderness to the left rib cage.  Pain with full inspiration.  No diminished lung sounds or respiratory distress.    Assessment/Plan:       1. Closed fracture of multiple ribs of left side with routine healing, subsequent encounter  - oxyCODONE immediate-release (ROXICODONE) 5 MG Tab; Take 1 Tablet by mouth every 6 hours as needed for Severe Pain for up to 3 days.  Dispense: 10 Tablet; Refill: 0    2. Work related injury      FROM   TO    May perform desk duties.  No driving or operating heavy machinery.   Patient has multiple mildly displaced rib fractures on the left side.  Is improving but still having pain.  Was referred to occupational medicine at his last visit.  Provided with occupational medicine phone number.  Referral is authorized.  Patient will schedule his next appointment with occupational medicine.  May return to urgent care earlier if worsening symptoms.  Light duty at this time.  Additional short course of pain medication.  Advised to start supplementing with ibuprofen/Tylenol and reduced pain medication is much as possible.    Differential diagnosis, natural history, supportive care, and indications for immediate follow-up discussed.    "

## 2024-09-20 NOTE — LETTER
PHYSICIAN’S AND CHIROPRACTIC PHYSICIAN'S   PROGRESS REPORT CERTIFICATION OF DISABILITY Claim Number:     Social Security Number:    Patient’s Name: Moisés Cardenas Date of Injury: 9/11/2024   Employer:   Name of MCO (if applicable):      Patient’s Job Description/Occupation: [employer]       Previous Injuries/Diseases/Surgeries Contributing to the Condition:         Diagnosis: (S22.42XD) Closed fracture of multiple ribs of left side with routine healing, subsequent encounter  (Y99.0) Work related injury      Related to the Industrial Injury? Yes     Explain:        Objective Medical Findings: Tenderness to the left rib cage.  Pain with full inspiration.  No diminished lung sounds or respiratory distress.         None - Discharged                         Stable  No                 Ratable  No     X   Generally Improved                         Condition Worsened                  Condition Same  May Have Suffered a Permanent Disability No     Treatment Plan:    Patient has multiple mildly displaced rib fractures on the left side.  Is improving but still having pain.  Was referred to occupational medicine at his last visit.  Provided with occupational medicine phone number.  Referral is authorized.  Patient will schedule his next appointment with occupational medicine.  May return to urgent care earlier if worsening symptoms.  Light duty at this time.  Additional short course of pain medication.  Advised to start supplementing with ibuprofen/Tylenol and reduced pain medication is much as possible.         No Change in Therapy                  PT/OT Prescribed                      Medication May be Used While Working        Case Management                          PT/OT Discontinued    Consultation    Further Diagnostic Studies:    Prescription(s) Referral to occupational medicine.         Oxycodone.  May use while at home.  Do not use while at work.     Released to FULL DUTY /No Restrictions on (Date):   From:      Certified TOTALLY TEMPORARILY DISABLED (Indicate Dates) From:   To:    X  Released to RESTRICTED/Modified Duty on (Date): From: 9/20/2024 To: 10/11/2024  Restrictions Are:         No Sitting    No Standing    No Pulling Other: May perform desk duties.  No driving or operating heavy machinery.       No Bending at Waist     No Stooping     No Lifting        No Carrying     No Walking Lifting Restricted to (lbs.):          No Pushing     X   No Climbing     No Reaching Above Shoulders       Date of Next Visit:    Date of this Exam: 9/20/2024 Physician/Chiropractic Physician Name: Shahram Pham P.A.-C. Physician/Chiropractic Physician Signature:  Brian Garcia DO MPH                                                                                                                                                                                                            D-39 (Rev. 2/24)

## 2024-09-27 ENCOUNTER — OCCUPATIONAL MEDICINE (OUTPATIENT)
Dept: OCCUPATIONAL MEDICINE | Facility: CLINIC | Age: 54
End: 2024-09-27
Payer: COMMERCIAL

## 2024-09-27 ENCOUNTER — APPOINTMENT (OUTPATIENT)
Dept: RADIOLOGY | Facility: IMAGING CENTER | Age: 54
End: 2024-09-27
Payer: COMMERCIAL

## 2024-09-27 VITALS
OXYGEN SATURATION: 94 % | SYSTOLIC BLOOD PRESSURE: 126 MMHG | TEMPERATURE: 98.3 F | WEIGHT: 174 LBS | HEART RATE: 81 BPM | RESPIRATION RATE: 14 BRPM | DIASTOLIC BLOOD PRESSURE: 84 MMHG | BODY MASS INDEX: 24.36 KG/M2 | HEIGHT: 71 IN

## 2024-09-27 DIAGNOSIS — S22.42XD CLOSED FRACTURE OF MULTIPLE RIBS OF LEFT SIDE WITH ROUTINE HEALING, SUBSEQUENT ENCOUNTER: ICD-10-CM

## 2024-09-27 PROBLEM — M54.12 CERVICAL RADICULOPATHY: Status: ACTIVE | Noted: 2022-11-12

## 2024-09-27 PROBLEM — M54.2 NECK PAIN: Status: ACTIVE | Noted: 2022-11-12

## 2024-09-27 PROCEDURE — 71100 X-RAY EXAM RIBS UNI 2 VIEWS: CPT | Mod: TC,LT | Performed by: RADIOLOGY

## 2024-09-27 PROCEDURE — 71046 X-RAY EXAM CHEST 2 VIEWS: CPT | Mod: TC | Performed by: RADIOLOGY

## 2024-09-27 PROCEDURE — 99214 OFFICE O/P EST MOD 30 MIN: CPT

## 2024-09-27 PROCEDURE — 3079F DIAST BP 80-89 MM HG: CPT

## 2024-09-27 PROCEDURE — 3074F SYST BP LT 130 MM HG: CPT

## 2024-09-27 RX ORDER — LEVALBUTEROL TARTRATE 45 UG/1
1-2 AEROSOL, METERED ORAL EVERY 4 HOURS PRN
Qty: 1 EACH | Refills: 0 | Status: SHIPPED | OUTPATIENT
Start: 2024-09-27

## 2024-09-27 RX ORDER — HYDROXYZINE PAMOATE 50 MG/1
CAPSULE ORAL
COMMUNITY

## 2024-09-27 RX ORDER — CLONIDINE HYDROCHLORIDE 0.1 MG/1
TABLET ORAL
COMMUNITY

## 2024-09-27 RX ORDER — CYCLOBENZAPRINE HCL 5 MG
5 TABLET ORAL 2 TIMES DAILY PRN
Qty: 14 TABLET | Refills: 0 | Status: SHIPPED | OUTPATIENT
Start: 2024-09-27 | End: 2024-10-04

## 2024-09-27 RX ORDER — OXYCODONE HYDROCHLORIDE 5 MG/1
5 TABLET ORAL EVERY 6 HOURS PRN
Qty: 12 TABLET | Refills: 0 | Status: SHIPPED | OUTPATIENT
Start: 2024-09-27 | End: 2024-09-30

## 2024-09-27 RX ORDER — SERTRALINE HYDROCHLORIDE 25 MG/1
1 TABLET, FILM COATED ORAL
COMMUNITY

## 2024-09-27 RX ORDER — PRAZOSIN HYDROCHLORIDE 1 MG/1
CAPSULE ORAL
COMMUNITY

## 2024-09-27 RX ORDER — FOLIC ACID 1 MG/1
TABLET ORAL
COMMUNITY

## 2024-09-27 RX ORDER — GABAPENTIN 300 MG/1
CAPSULE ORAL
COMMUNITY

## 2024-09-27 RX ORDER — TRAMADOL HYDROCHLORIDE 50 MG/1
1 TABLET ORAL 3 TIMES DAILY
COMMUNITY
End: 2024-09-27

## 2024-09-27 NOTE — LETTER
PHYSICIAN’S AND CHIROPRACTIC PHYSICIAN'S   PROGRESS REPORT CERTIFICATION OF DISABILITY Claim Number:     Social Security Number:    Patient’s Name: Moisés Cardenas Date of Injury: 9/11/2024   Employer:  [employer]  Name of MCO (if applicable):      Patient’s Job Description/Occupation: [employer]       Previous Injuries/Diseases/Surgeries Contributing to the Condition:  Denies known previous medical injuries, diseases.      Diagnosis: (S22.42XD) Closed fracture of multiple ribs of left side with routine healing, subsequent encounter      Related to the Industrial Injury? Yes     Explain: See initial intake documentation      Objective Medical Findings: Physical Exam  Vitals and nursing note reviewed.   Constitutional:       Appearance: Normal appearance.   Cardiovascular:      Rate and Rhythm: Normal rate and regular rhythm.      Heart sounds: Normal heart sounds.   Pulmonary:      Effort: Pulmonary effort is normal.      Breath sounds: Examination of the right-lower field reveals decreased breath sounds. Examination of the left-lower field reveals decreased breath sounds. Decreased breath sounds present. No wheezing, rhonchi or rales.   Chest:      Chest wall: Tenderness present. No mass, lacerations, deformity, swelling, crepitus or edema.        Abdominal:      General: Bowel sounds are normal.      Palpations: Abdomen is soft.   Musculoskeletal:         General: Tenderness and signs of injury present. No swelling or deformity.   Skin:     Capillary Refill: Capillary refill takes less than 2 seconds.      Findings: No erythema.   Neurological:      Mental Status: He is alert and oriented to person, place, and time.          None - Discharged                         Stable  No                 Ratable  No        Generally Improved                      X   Condition Worsened                  Condition Same  May Have Suffered a Permanent Disability No     Treatment Plan:    X-ray of ribs and  chest repeated today due to reports of night sweats, refusal to cough due to pain.  Concerned patient may have early signs of pneumonia.  Per radiology impression of chest xray: 1.  Mild increase in mild discoid atelectasis in the lung bases compared to the prior radiograph.  2.  No consolidations identified. The rib xrays were obtained to monitor of healing of rib fractures, per radiology findings: Healing rib fractures of left sixth through ninth ribs are noted. There is continued displacement of the seventh rib. No pneumothorax is identified.    Recommend rest, application of ice packs, NSAIDs/on package instructions, activity as tolerated. Lidocaine patches and voltaren gel advised. Patient reports the hospital gave him an incentive spirometer. Will have him resume use of IS. Reviewed use and frequency of use. Recommend 10 breaths per hour while awake. Levoalbuterol (preferred formulary) ordered in case he develops shortness of breath.  Oxycodone refilled. Patient states the IBU and pain meds cause GI distress. Will have him start omeprazole and take prior to pain medications. Should he continue to not be tolerant of pain medications, recommend extra strength tylenol per package instructions and flexeril ordered.  Patient instructed to avoid driving and/or use of alcohol while taking pain medications or Flexeril.    Work Restrictions: Light duty only. No pushing, pulling, lifting, carrying of objects weighing more than 10 pounds.    Follow up: one week or sooner should he develop worsening breathing difficulties.         No Change in Therapy                  PT/OT Prescribed                      Medication May be Used While Working        Case Management                          PT/OT Discontinued    Consultation    Further Diagnostic Studies:    Prescription(s)                 Released to FULL DUTY /No Restrictions on (Date):  From:      Certified TOTALLY TEMPORARILY DISABLED (Indicate Dates) From:   To:    X   Released to RESTRICTED/Modified Duty on (Date): From: 9/27/2024 To: 10/3/2024  Restrictions Are:         No Sitting    No Standing    No Pulling Other: Light duty only. No pushing, pulling, lifting, carrying of objects weighing more than 10 pounds.         No Bending at Waist     No Stooping     No Lifting        No Carrying     No Walking Lifting Restricted to (lbs.):  < or = to 10 pounds       No Pushing        No Climbing     No Reaching Above Shoulders       Date of Next Visit:  10/3/2024  @8:00 AM  Date of this Exam: 9/27/2024 Physician/Chiropractic Physician Name: HENNA Tapia Physician/Chiropractic Physician Signature:  Brian Garcia DO MPH                                                                                                                                                                                                            D-39 (Rev. 2/24)

## 2024-09-27 NOTE — PROGRESS NOTES
"Subjective:   Moisés Cardenas is a 54 y.o. male who presents for Other      HPI:    Patient presents for his third WC IV today.  DOI: 09/11/2024  Chief complaint: Left lateral rib pain    Patient reports no significant improvement in his pain.  He continues to have sharp pain when he twists or bends.  He states, \"I do not let myself cough.\"  He endorses night sweats, and uptick in the urge to cough.  Denies fever, chills.  He reports he vapes.  Denies recurrence of injury.  Denies shortness of breath or increased work of breathing.     ROS As above in HPI    Medications:    Current Outpatient Medications on File Prior to Visit   Medication Sig Dispense Refill    docusate sodium (COLACE) 100 MG Cap Take 100 mg by mouth 2 times a day.      ondansetron (ZOFRAN ODT) 4 MG TABLET DISPERSIBLE Take 4 mg by mouth every 6 hours as needed for Nausea/Vomiting.      cyclobenzaprine (FLEXERIL) 5 mg tablet Take 1 Tablet by mouth at bedtime as needed for Moderate Pain or Muscle Spasms. 10 Tablet 0     No current facility-administered medications on file prior to visit.        Allergies:   Patient has no known allergies.    Problem List:   There is no problem list on file for this patient.       Surgical History:  No past surgical history on file.    Past Social Hx:   Social History     Tobacco Use    Smoking status: Former     Current packs/day: 0.25     Types: Cigarettes    Smokeless tobacco: Never   Vaping Use    Vaping status: Every Day    Substances: Nicotine    Devices: Disposable   Substance Use Topics    Alcohol use: Not Currently     Comment: occas    Drug use: Not Currently     Types: Inhaled          Problem list, medications, and allergies reviewed by myself today in Epic.     Objective:     /84   Pulse 81   Temp 36.8 °C (98.3 °F) (Temporal)   Resp 14   Ht 1.803 m (5' 11\")   Wt 78.9 kg (174 lb)   SpO2 94%   BMI 24.27 kg/m²     Physical Exam  Vitals and nursing note reviewed.   Constitutional:       " Appearance: Normal appearance.   Cardiovascular:      Rate and Rhythm: Normal rate and regular rhythm.      Heart sounds: Normal heart sounds.   Pulmonary:      Effort: Pulmonary effort is normal.      Breath sounds: Examination of the right-lower field reveals decreased breath sounds. Examination of the left-lower field reveals decreased breath sounds. Decreased breath sounds present. No wheezing, rhonchi or rales.   Chest:      Chest wall: Tenderness present. No mass, lacerations, deformity, swelling, crepitus or edema.       Abdominal:      General: Bowel sounds are normal.      Palpations: Abdomen is soft.   Musculoskeletal:         General: Tenderness and signs of injury present. No swelling or deformity.   Skin:     Capillary Refill: Capillary refill takes less than 2 seconds.      Findings: No erythema.   Neurological:      Mental Status: He is alert and oriented to person, place, and time.         Assessment/Plan:     DX-CHEST-2 VIEWS 09/27/2024    Narrative  9/27/2024 2:08 PM    HISTORY/REASON FOR EXAM:  Chest pain and cough.      TECHNIQUE/EXAM DESCRIPTION AND NUMBER OF VIEWS:  Two views of the chest.    COMPARISON:  2 9/13/2024    FINDINGS:  The heart is normal in size.  No pulmonary infiltrates or consolidations are noted. Linear opacifications in each lung base are slightly more prominent than on prior exam.  No pleural effusions are appreciated.    Impression  1.  Mild increase in mild discoid atelectasis in the lung bases compared to the prior radiograph.    2.  No consolidations identified.    [id number] (W/O CXR) LEFT 09/27/2024    Narrative  9/27/2024 2:21 PM    HISTORY/REASON FOR EXAM:  confirm healing, minimall displaced left lateral 7th- 9th ribs.  Follow-up left rib fractures    TECHNIQUE/EXAM DESCRIPTION AND NUMBER OF VIEWS:  2 views of the left ribs.    COMPARISON: NONE    FINDINGS:  Healing rib fractures of left sixth through ninth ribs are noted. There is continued displacement of  the seventh rib. No pneumothorax is identified.    Impression  Healing left rib fractures are identified. No new fracture or malalignment is noted.            Diagnosis and associated orders:   1. Closed fracture of multiple ribs of left side with routine healing, subsequent encounter  - DX-CHEST-2 VIEWS; Future  - [id number] (W/O CXR) LEFT; Future    Other orders  - sertraline (ZOLOFT) 25 MG tablet; Take 1 Tablet by mouth every day.  - prazosin (MINIPRESS) 1 MG Cap  - hydrOXYzine pamoate (VISTARIL) 50 MG Cap  - gabapentin (NEURONTIN) 300 MG Cap  - folic acid (FOLVITE) 1 MG Tab  - cloNIDine (CATAPRES) 0.1 MG Tab        Comments/MDM:     X-ray of ribs and chest repeated today due to reports of night sweats, refusal to cough due to pain.  Concerned patient may have early signs of pneumonia.  Per radiology impression of chest xray: 1.  Mild increase in mild discoid atelectasis in the lung bases compared to the prior radiograph.  2.  No consolidations identified. The rib xrays were obtained to monitor of healing of rib fractures, per radiology findings: Healing rib fractures of left sixth through ninth ribs are noted. There is continued displacement of the seventh rib. No pneumothorax is identified.    Recommend rest, application of ice packs, NSAIDs/on package instructions, activity as tolerated. Lidocaine patches and voltaren gel advised. Patient reports the hospital gave him an incentive spirometer. Will have him resume use of IS. Reviewed use and frequency of use. Recommend 10 breaths per hour while awake. Levoalbuterol (preferred formulary) ordered in case he develops shortness of breath.  Oxycodone refilled. Patient states the IBU and pain meds cause GI distress. Will have him start omeprazole and take prior to pain medications. Should he continue to not be tolerant of pain medications, recommend extra strength tylenol per package instructions and flexeril ordered. Patient instructed to avoid driving  and/or use of alcohol while taking pain medications or Flexeril.    Work Restrictions: Light duty only. No pushing, pulling, lifting, carrying of objects weighing more than 10 pounds.    Follow up: one week or sooner should he develop worsening breathing difficulties.       Return to clinic or go to ED if symptoms worsen or persist. Indications for ED discussed at length. Patient/Parent/Guardian voices understanding. Follow-up with your primary care provider in 3-5 days. Red flag symptoms discussed. All side effects of medication discussed including allergic response, GI upset, tendon injury, rash, sedation etc.    Please note that this dictation was created using voice recognition software. I have made a reasonable attempt to correct obvious errors, but I expect that there are errors of grammar and possibly content that I did not discover before finalizing the note.    This note was electronically signed by BECKY Gong

## 2024-10-03 ENCOUNTER — OCCUPATIONAL MEDICINE (OUTPATIENT)
Dept: OCCUPATIONAL MEDICINE | Facility: CLINIC | Age: 54
End: 2024-10-03
Payer: COMMERCIAL

## 2024-10-03 VITALS
DIASTOLIC BLOOD PRESSURE: 86 MMHG | WEIGHT: 179 LBS | TEMPERATURE: 98.4 F | OXYGEN SATURATION: 97 % | SYSTOLIC BLOOD PRESSURE: 130 MMHG | BODY MASS INDEX: 25.06 KG/M2 | HEIGHT: 71 IN | HEART RATE: 74 BPM | RESPIRATION RATE: 16 BRPM

## 2024-10-03 DIAGNOSIS — S22.42XD CLOSED FRACTURE OF MULTIPLE RIBS OF LEFT SIDE WITH ROUTINE HEALING, SUBSEQUENT ENCOUNTER: ICD-10-CM

## 2024-10-03 PROCEDURE — 99213 OFFICE O/P EST LOW 20 MIN: CPT | Performed by: NURSE PRACTITIONER

## 2024-10-03 PROCEDURE — 3075F SYST BP GE 130 - 139MM HG: CPT | Performed by: NURSE PRACTITIONER

## 2024-10-03 PROCEDURE — 3079F DIAST BP 80-89 MM HG: CPT | Performed by: NURSE PRACTITIONER

## 2024-10-03 RX ORDER — CYCLOBENZAPRINE HCL 5 MG
5 TABLET ORAL 2 TIMES DAILY PRN
Qty: 14 TABLET | Refills: 0 | Status: SHIPPED | OUTPATIENT
Start: 2024-10-03 | End: 2024-10-10

## 2024-10-03 RX ORDER — NAPROXEN 500 MG/1
500 TABLET ORAL 2 TIMES DAILY WITH MEALS
Qty: 60 TABLET | Refills: 0 | Status: SHIPPED | OUTPATIENT
Start: 2024-10-03

## 2024-10-24 ENCOUNTER — OCCUPATIONAL MEDICINE (OUTPATIENT)
Dept: OCCUPATIONAL MEDICINE | Facility: CLINIC | Age: 54
End: 2024-10-24
Payer: COMMERCIAL

## 2024-10-24 VITALS
DIASTOLIC BLOOD PRESSURE: 80 MMHG | BODY MASS INDEX: 25.2 KG/M2 | HEART RATE: 80 BPM | SYSTOLIC BLOOD PRESSURE: 120 MMHG | TEMPERATURE: 99.7 F | OXYGEN SATURATION: 93 % | HEIGHT: 71 IN | WEIGHT: 180 LBS

## 2024-10-24 DIAGNOSIS — S22.42XD CLOSED FRACTURE OF MULTIPLE RIBS OF LEFT SIDE WITH ROUTINE HEALING, SUBSEQUENT ENCOUNTER: ICD-10-CM

## 2024-10-24 PROCEDURE — 3079F DIAST BP 80-89 MM HG: CPT | Performed by: NURSE PRACTITIONER

## 2024-10-24 PROCEDURE — 3074F SYST BP LT 130 MM HG: CPT | Performed by: NURSE PRACTITIONER

## 2024-10-24 PROCEDURE — 99213 OFFICE O/P EST LOW 20 MIN: CPT | Performed by: NURSE PRACTITIONER

## 2024-10-24 ASSESSMENT — ENCOUNTER SYMPTOMS
MYALGIAS: 1
HEMOPTYSIS: 0
WHEEZING: 0
SHORTNESS OF BREATH: 0
NEUROLOGICAL NEGATIVE: 1
PSYCHIATRIC NEGATIVE: 1
CONSTITUTIONAL NEGATIVE: 1
SPUTUM PRODUCTION: 0
COUGH: 0

## 2024-11-07 ENCOUNTER — OCCUPATIONAL MEDICINE (OUTPATIENT)
Dept: OCCUPATIONAL MEDICINE | Facility: CLINIC | Age: 54
End: 2024-11-07
Payer: COMMERCIAL

## 2024-11-07 ENCOUNTER — HOSPITAL ENCOUNTER (EMERGENCY)
Facility: MEDICAL CENTER | Age: 54
End: 2024-11-07
Attending: EMERGENCY MEDICINE
Payer: COMMERCIAL

## 2024-11-07 ENCOUNTER — APPOINTMENT (OUTPATIENT)
Dept: RADIOLOGY | Facility: IMAGING CENTER | Age: 54
End: 2024-11-07
Attending: NURSE PRACTITIONER
Payer: COMMERCIAL

## 2024-11-07 ENCOUNTER — APPOINTMENT (OUTPATIENT)
Dept: RADIOLOGY | Facility: MEDICAL CENTER | Age: 54
End: 2024-11-07
Attending: EMERGENCY MEDICINE
Payer: COMMERCIAL

## 2024-11-07 VITALS
BODY MASS INDEX: 25.2 KG/M2 | WEIGHT: 180 LBS | TEMPERATURE: 97.5 F | HEART RATE: 84 BPM | SYSTOLIC BLOOD PRESSURE: 125 MMHG | HEIGHT: 71 IN | DIASTOLIC BLOOD PRESSURE: 80 MMHG | RESPIRATION RATE: 16 BRPM

## 2024-11-07 VITALS
OXYGEN SATURATION: 96 % | HEART RATE: 77 BPM | RESPIRATION RATE: 18 BRPM | BODY MASS INDEX: 25.09 KG/M2 | WEIGHT: 179.9 LBS | TEMPERATURE: 97.1 F | DIASTOLIC BLOOD PRESSURE: 68 MMHG | SYSTOLIC BLOOD PRESSURE: 102 MMHG

## 2024-11-07 DIAGNOSIS — S22.42XD CLOSED FRACTURE OF MULTIPLE RIBS OF LEFT SIDE WITH ROUTINE HEALING, SUBSEQUENT ENCOUNTER: ICD-10-CM

## 2024-11-07 DIAGNOSIS — S22.42XA CLOSED FRACTURE OF MULTIPLE RIBS OF LEFT SIDE, INITIAL ENCOUNTER: ICD-10-CM

## 2024-11-07 PROCEDURE — 99213 OFFICE O/P EST LOW 20 MIN: CPT | Performed by: NURSE PRACTITIONER

## 2024-11-07 PROCEDURE — 3079F DIAST BP 80-89 MM HG: CPT | Performed by: NURSE PRACTITIONER

## 2024-11-07 PROCEDURE — 1125F AMNT PAIN NOTED PAIN PRSNT: CPT | Performed by: NURSE PRACTITIONER

## 2024-11-07 PROCEDURE — 71101 X-RAY EXAM UNILAT RIBS/CHEST: CPT | Mod: TC,LT

## 2024-11-07 PROCEDURE — 99283 EMERGENCY DEPT VISIT LOW MDM: CPT

## 2024-11-07 PROCEDURE — 3074F SYST BP LT 130 MM HG: CPT | Performed by: NURSE PRACTITIONER

## 2024-11-07 ASSESSMENT — ENCOUNTER SYMPTOMS
SPUTUM PRODUCTION: 0
HEMOPTYSIS: 0
NEUROLOGICAL NEGATIVE: 1
WEIGHT LOSS: 0
SHORTNESS OF BREATH: 0
BRUISES/BLEEDS EASILY: 0
MYALGIAS: 1
COUGH: 0
ORTHOPNEA: 0
CHILLS: 0
WHEEZING: 0
PALPITATIONS: 0
PSYCHIATRIC NEGATIVE: 1
DIAPHORESIS: 0
FEVER: 0

## 2024-11-07 ASSESSMENT — PAIN SCALES - GENERAL: PAINLEVEL_OUTOF10: 1=MINIMAL PAIN

## 2024-11-07 NOTE — LETTER
PHYSICIAN’S AND CHIROPRACTIC PHYSICIAN'S   PROGRESS REPORT   CERTIFICATION OF DISABILITY Claim Number:     Social Security Number:    Patient’s Name: Moisés Cardenas Date of Injury: 9/11/2024   Employer: [employer]  Name of MCO (if applicable):      Patient’s Job Description/Occupation: [employer]       Previous Injuries/Diseases/Surgeries Contributing to the Condition:         Diagnosis: (S22.42XD) Closed fracture of multiple ribs of left side with routine healing, subsequent encounter      Related to the Industrial Injury? Yes     Explain: DOI: 9/11/24. JONAS: Patient reports he was stepping out of his truck and fell backward, striking his left side while at work. Patient presented to the emergency department following the injury at an outlying facility where a chest CT was performed, demonstrating minimally displaced left 7th-9th rib fractures. No pneumothorax or pleural effusion on imaging.       Objective Medical Findings: Effort: Pulmonary effort is normal.      Breath sounds: Examination of the right-lower field reveals decreased breath sounds. Examination of the left-lower field reveals decreased breath sounds. Decreased breath sounds present. No wheezing, rhonchi or rales.   Chest:   Chest wall: Tenderness present. No mass, lacerations, deformity, swelling, crepitus or edema.            None - Discharged                         Stable  No                 Ratable  No     X   Generally Improved                         Condition Worsened               X   Condition Same  May Have Suffered a Permanent Disability No     Treatment Plan:    Follow-up in 1 week  Recommend going to ED for evaluation with new or worsening symptoms  Recommend continue with OTC Tylenol, ice/heat application, 10 deep breaths an hour, incentive spirometer  And strict ED precautions discussed with patient         No Change in Therapy                  PT/OT Prescribed                      Medication May be Used While  Working        Case Management                          PT/OT Discontinued    Consultation    Further Diagnostic Studies:    Prescription(s) Go to ED for further evaluation management of symptoms worsened  Cardiothoracic surgery referral placed for further evaluation and management    Repeat Xray: ACDF partially visualized. Grossly similar appearance of left-sided rib fractures including some interval healing of rib fractures. There is a displaced left lateral seventh fracture without bony bridging.     IMPRESSION: No acute cardiopulmonary abnormality. Multiple left-sided rib fractures again noted with some interval healing.            Released to FULL DUTY /No Restrictions on (Date):       Certified TOTALLY TEMPORARILY DISABLED (Indicate Dates) From:   To:    X  Released to RESTRICTED/Modified Duty on (Date): From: 11/7/2024 To: 11/14/2024   Restrictions Are:  Temporary      No Sitting    No Standing    No Pulling Other: Avoid lifting, pushing, pulling, or carrying anything more than 15 pounds       No Bending at Waist     No Stooping     No Lifting        No Carrying     No Walking Lifting Restricted to (lbs.):          No Pushing        No Climbing     No Reaching Above Shoulders       Date of Next Visit:  11/14/2024  @ 3:45 PM  Date of this Exam: 11/7/2024 Physician/Chiropractic Physician Name: HENNA Rodarte Physician/Chiropractic Physician Signature:  Brian Garcia DO MPH     [place]:  [address] [place] [address], [address] [place], [place] [address] - Telephone [phone] [place]:  [address] [place] [address], [address] [place], [place] [address] - Telephone [phone]    [email or web address]  D-39 (Rev. 10/24)

## 2024-11-07 NOTE — LETTER
PHYSICIAN’S AND CHIROPRACTIC PHYSICIAN'S   PROGRESS REPORT   CERTIFICATION OF DISABILITY Claim Number:     Social Security Number:    Patient’s Name: Moisés Cardenas Date of Injury: 9/11/2024   Employer:  [employer] Name of MCO (if applicable):      Patient’s Job Description/Occupation:       Previous Injuries/Diseases/Surgeries Contributing to the Condition:  Rib fractures      Diagnosis: (S22.42XA) Closed fracture of multiple ribs of left side, initial encounter      Related to the Industrial Injury? Yes     Explain:        Objective Medical Findings: tender         None - Discharged                         Stable  No                 Ratable  No        Generally Improved                         Condition Worsened                  Condition Same  May Have Suffered a Permanent Disability No     Treatment Plan:              No Change in Therapy                  PT/OT Prescribed                      Medication May be Used While Working        Case Management                          PT/OT Discontinued    Consultation    Further Diagnostic Studies:    Prescription(s)                 Released to FULL DUTY /No Restrictions on (Date):       Certified TOTALLY TEMPORARILY DISABLED (Indicate Dates) From:   To:      Released to RESTRICTED/Modified Duty on (Date): From:   To:    Restrictions Are:         No Sitting    No Standing    No Pulling Other: Per [facility] as previously noted.       No Bending at Waist     No Stooping     No Lifting        No Carrying     No Walking Lifting Restricted to (lbs.):          No Pushing        No Climbing     No Reaching Above Shoulders       Date of Next Visit:    Date of this Exam: 11/7/2024 Physician/Chiropractic Physician Name:  Rich Richmond MD Physician/Chiropractic Physician Signature:     [place]:  [facility], [address] [place], [place] [address] - Telephone [phone] [place]:  [address]  [facility], [address] [place], [place] [address] -  Telephone [phone]    [email or web address]  D-39 (Rev. 10/24)

## 2024-11-07 NOTE — PROGRESS NOTES
Subjective:     Moisés Cardenas is a 54 y.o. male who presents for Follow-Up ((WC DOI 09.11.24,RIB FRACTURES/ better) RM 18)    DOI: 9/11/24. JONAS: Patient reports he was stepping out of his truck and fell backward, striking his left side while at work. Patient presented to the emergency department following the injury at an outlying facility where a chest CT was performed, demonstrating minimally displaced left 7th-9th rib fractures. No pneumothorax or pleural effusion on imaging.      Today patient states that symptoms have slowly improving.  He states that he occasionally will use on out of nowhere and he feels like he gets extremely tired quickly, but no actual shortness of breath that he is aware of.  He states he is not paying attention to see if he is short of breath.  Continued  mild tenderness on the left lateral side of his rib cage.  He has some discomfort when he tries to take deep breath.  He does not have any pain with cough.  He was provided with an incentive spirometer that he has been using every hour as instructed.  He is no longer using ibuprofen and Tylenol and the muscle relaxer.  He denies coughing up blood, shortness of breath, fever, chills, or wheezing.  He is currently tolerating light duty and has been doing a little bit more he states he is a little more sore at the end of the day but is not causing him severe pain like in the beginning of the injury.  Discussed length of injury and recovery time.  Patient verbalizes understanding.  X-rays repeated and results reviewed with patient.  Encouraged patient to go to ED for further evaluation at this time.  Cardiothoracic surgery referral placed for consultation purposes.  Plan of care discussed with patient.       Review of Systems   Constitutional:  Positive for malaise/fatigue. Negative for chills, diaphoresis, fever and weight loss.   Respiratory:  Negative for cough, hemoptysis, sputum production, shortness of breath and wheezing.   "  Cardiovascular:  Negative for chest pain, palpitations and orthopnea.        Has a history of a heart murmur that he sees a cardiologist for every 6 months   Musculoskeletal:  Positive for myalgias.   Skin: Negative.    Neurological: Negative.    Endo/Heme/Allergies:  Does not bruise/bleed easily.   Psychiatric/Behavioral: Negative.         SOCHX: Works as a  at [employer]  FH: No pertinent family history to this problem       Objective:     /80 (BP Location: Right arm, Patient Position: Sitting, BP Cuff Size: Adult)   Pulse 84   Temp 36.4 °C (97.5 °F)   Resp 16   Ht 1.803 m (5' 11\")   Wt 81.6 kg (180 lb)   BMI 25.10 kg/m²     Constitutional: Patient is in no acute distress. Appears well-developed and well-nourished.   Cardiovascular: Normal rate.    Pulmonary/Chest: Effort normal. No respiratory distress.   Neurological: Patient is alert and oriented to person, place, and time.   Skin: Skin is warm and dry.   Psychiatric: Normal mood and affect. Behavior is normal.     Effort: Pulmonary effort is normal.      Breath sounds: Examination of the right-lower field reveals decreased breath sounds. Examination of the left-lower field reveals decreased breath sounds. Decreased breath sounds present. No wheezing, rhonchi or rales.   Chest:   Chest wall: Tenderness present. No mass, lacerations, deformity, swelling, crepitus or edema.       Assessment/Plan:       1. Closed fracture of multiple ribs of left side with routine healing, subsequent encounter  - [id number] (WITH 1-VIEW CXR) LEFT; Future  - Referral to Cardiothoracic Surgery    Follow-up in 1 week  Recommend going to ED for evaluation with new or worsening symptoms  Recommend continue with OTC Tylenol, ice/heat application, 10 deep breaths an hour, incentive spirometer  And strict ED precautions discussed with patient          Differential diagnosis, natural history, supportive care, and indications for immediate follow-up " discussed.    Approximately 30 minutes was spent in preparing for visit, obtaining history, exam and evaluation, patient counseling/education and post visit documentation/orders.

## 2024-11-08 NOTE — ED TRIAGE NOTES
Chief Complaint   Patient presents with    Sent by MD     Pt sent by work comp MD for change in size of left lung. Pt reports that he has some minor SOB and multiple rib Fx from an accident.    BP (!) 144/91   Pulse 69   Temp 36.2 °C (97.1 °F) (Temporal)   Resp 17   Wt 81.6 kg (179 lb 14.3 oz)   SpO2 96%   Pt informed of wait times. Educated on triage process.  Asked to return to triage RN for any new or worsening of symptoms. Thanked for patience.

## 2024-11-08 NOTE — ED PROVIDER NOTES
ED Provider Note    CHIEF COMPLAINT  Chief Complaint   Patient presents with    Sent by MD       EXTERNAL RECORDS REVIEWED  Outpatient Notes sent by the occupational health clinic for further evaluation and Outpatient labs & studies rib fractures healing as well as nonunion    HPI/ROS  LIMITATION TO HISTORY   Select: : None      Moisés Cardenas is a 54 y.o. male who presents at the recommendation of occupational health for further evaluation.  He initially fell off his truck at work back in September.  Plain films at that time of his chest were negative, he subsequently had x-rays 2 weeks later which showed healing rib fractures, and then again today which showed some interval healing but also some nonunion.  He has intermittent shortness of breath and hurts if he moves certain ways but overall he is doing better.  No other injury.  No other complaint.    PAST MEDICAL HISTORY       SURGICAL HISTORY  patient denies any surgical history    FAMILY HISTORY  History reviewed. No pertinent family history.    SOCIAL HISTORY  Social History     Tobacco Use    Smoking status: Former     Current packs/day: 0.25     Types: Cigarettes    Smokeless tobacco: Never   Vaping Use    Vaping status: Every Day    Substances: Nicotine    Devices: Disposable   Substance and Sexual Activity    Alcohol use: Not Currently     Comment: occas    Drug use: Not Currently     Types: Inhaled    Sexual activity: Not on file       CURRENT MEDICATIONS  Home Medications       Reviewed by Lisa Anderson R.N. (Registered Nurse) on 11/07/24 at 1826  Med List Status: Partial     Medication Last Dose Status   cloNIDine (CATAPRES) 0.1 MG Tab  Active   docusate sodium (COLACE) 100 MG Cap  Active   folic acid (FOLVITE) 1 MG Tab  Active   gabapentin (NEURONTIN) 300 MG Cap  Active   hydrOXYzine pamoate (VISTARIL) 50 MG Cap  Active   levalbuterol (XOPENEX HFA) 45 MCG/ACT inhaler  Active   naproxen (NAPROSYN) 500 MG Tab  Active   omeprazole (PRILOSEC)  20 MG delayed-release capsule  Active   ondansetron (ZOFRAN ODT) 4 MG TABLET DISPERSIBLE  Active   prazosin (MINIPRESS) 1 MG Cap  Active   sertraline (ZOLOFT) 25 MG tablet  Active                    ALLERGIES  No Known Allergies    PHYSICAL EXAM  VITAL SIGNS: /80   Pulse 70   Temp 36.2 °C (97.1 °F) (Temporal)   Resp 17   Wt 81.6 kg (179 lb 14.3 oz)   SpO2 93%   BMI 25.09 kg/m²    Constitutional: Well appearing patient in no acute distress.  HENT: Head is without trauma.  Oropharynx is clear.  Mucous membranes are moist.  Eyes: Sclerae are nonicteric, pupils are equally round.  Neck: Supple with grossly normal range of motion.  Cardiovascular: Heart is regular rate and rhythm.  He has a loud blowing holosystolic murmur heard loudest at the right upper sternal border.  Radiates throughout the upper precordium.  Thorax & Lungs: Breathing easily.  No particular tenderness.  Good air movement.  There is no wheeze, rhonchi or rales.  Abdomen: Bowel sounds normal, soft, non-distended, nontender, no mass nor pulsatile mass. I do not appreciate hepatosplenomegaly.  Psychiatric: Normal for situation    COURSE & MEDICAL DECISION MAKING    ASSESSMENT, COURSE AND PLAN  Care Narrative: This patient presents from occupational health for further evaluation.  It sounds that the concern was he had an elevated hemidiaphragm on the left.  Patient does have known chest trauma here, with rib fractures and very stages of healing.  He is still having some tenderness but overall is improving from his initial injury back in mid September.  Looking at his films does not look particular suspicious for a diaphragmatic hernia.  No evidence of pneumonia.  This point I am not sure as he needs any further emergent workup.  I did call and discussed the patient's case with Dr. Carter, trauma surgery.  He reviewed the patient's films.  He agrees that no emergent workup is necessary, he is not sure the patient needs any workup.  This was  discussed with the patient, we could perform a CT scan looking for occult injury, however, given his overall improvements I think that it would be best to defer this for now.    We did talk about his murmur.  It sounds like he has a tight aortic valve.  He is getting every 6 month echoes and following with his doctor.    Rib fracture instructions      DISPOSITION AND DISCUSSIONS  I have discussed management of the patient with the following physicians and PATRICK's: Trauma surgery    Escalation of care considered, and ultimately not performed:diagnostic imaging      FINAL DIAGNOSIS  1. Closed fracture of multiple ribs of left side, initial encounter         Electronically signed by: Rich Richmond M.D., 11/7/2024 8:07 PM

## 2024-11-19 ENCOUNTER — OCCUPATIONAL MEDICINE (OUTPATIENT)
Dept: OCCUPATIONAL MEDICINE | Facility: CLINIC | Age: 54
End: 2024-11-19
Payer: COMMERCIAL

## 2024-11-19 VITALS
DIASTOLIC BLOOD PRESSURE: 78 MMHG | HEART RATE: 74 BPM | RESPIRATION RATE: 16 BRPM | WEIGHT: 179 LBS | OXYGEN SATURATION: 96 % | BODY MASS INDEX: 25.06 KG/M2 | HEIGHT: 71 IN | SYSTOLIC BLOOD PRESSURE: 128 MMHG

## 2024-11-19 DIAGNOSIS — S22.42XD CLOSED FRACTURE OF MULTIPLE RIBS OF LEFT SIDE WITH ROUTINE HEALING, SUBSEQUENT ENCOUNTER: ICD-10-CM

## 2024-11-19 PROCEDURE — 99213 OFFICE O/P EST LOW 20 MIN: CPT | Performed by: NURSE PRACTITIONER

## 2024-11-19 PROCEDURE — 3078F DIAST BP <80 MM HG: CPT | Performed by: NURSE PRACTITIONER

## 2024-11-19 PROCEDURE — 3074F SYST BP LT 130 MM HG: CPT | Performed by: NURSE PRACTITIONER

## 2024-11-19 ASSESSMENT — ENCOUNTER SYMPTOMS
CONSTITUTIONAL NEGATIVE: 1
SHORTNESS OF BREATH: 0
WEAKNESS: 0
CARDIOVASCULAR NEGATIVE: 1
MYALGIAS: 1
SENSORY CHANGE: 0
TINGLING: 0
PSYCHIATRIC NEGATIVE: 1
WHEEZING: 0
COUGH: 0

## 2024-11-19 NOTE — LETTER
PHYSICIAN’S AND CHIROPRACTIC PHYSICIAN'S   PROGRESS REPORT   CERTIFICATION OF DISABILITY Claim Number:     Social Security Number:    Patient’s Name: Moisés Cardenas Date of Injury: 9/11/2024   Employer: [employer]  Name of MCO (if applicable):      Patient’s Job Description/Occupation: [employer]       Previous Injuries/Diseases/Surgeries Contributing to the Condition:         Diagnosis: (S22.42XD) Closed fracture of multiple ribs of left side with routine healing, subsequent encounter      Related to the Industrial Injury? Yes     Explain: DOI: 9/11/24. JONAS: Patient reports he was stepping out of his truck and fell backward, striking his left side while at work. Patient presented to the emergency department following the injury at an outlying facility where a chest CT was performed, demonstrating minimally displaced left 7th-9th rib fractures. No pneumothorax or pleural effusion on imaging.            Objective Medical Findings: Pulmonary effort is normal.      Breath sounds: Examination of the right-lower field reveals decreased breath sounds. Examination of the left-lower field reveals decreased breath sounds. Decreased breath sounds present. No wheezing, rhonchi or rales.   Chest:   Chest wall: Tenderness present. No mass, lacerations, deformity, swelling, crepitus or edema.          None - Discharged                         Stable  Yes                 Ratable  No     X   Generally Improved                         Condition Worsened               X   Condition Same  May Have Suffered a Permanent Disability No     Treatment Plan:    Follow-up in 3 weeks  Continue with OTC Tylenol/ibuprofen if needed, ice/heat application, and gentle range of motion stretching as tolerated  Continue with incentive spirometer as recommended  Strict ED precautions discussed with patient         No Change in Therapy                  PT/OT Prescribed                      Medication May be Used While Working      X   Case Management                          PT/OT Discontinued  X  Consultation  X  Further Diagnostic Studies:    Prescription(s) Cardiothoracic appointment scheduled for December 3                 Released to FULL DUTY /No Restrictions on (Date):       Certified TOTALLY TEMPORARILY DISABLED (Indicate Dates) From:   To:    X  Released to RESTRICTED/Modified Duty on (Date): From: 11/19/2024 To: 12/13/2024   Restrictions Are:  Temporary      No Sitting    No Standing    No Pulling Other: Avoid pushing, pulling, or lifting more than 25 lbs        No Bending at Waist     No Stooping     No Lifting        No Carrying     No Walking Lifting Restricted to (lbs.):  < or = to 25 pounds       No Pushing        No Climbing     No Reaching Above Shoulders       Date of Next Visit:  12/13/2024  @ 3:45 PM  Date of this Exam: 11/19/2024 Physician/Chiropractic Physician Name: HENNA Rodarte Physician/Chiropractic Physician Signature:  Brian Garcia DO MPH     [place]:  [address] [place] [address], [address] [place], [place] [address] - Telephone [phone] [place]:  [address] [place] [address], [address] [place], [place] [address] - Telephone [phone]    [email or web address]  D-39 (Rev. 10/24)

## 2024-11-19 NOTE — PROGRESS NOTES
Subjective:     Moisés Cardenas is a 54 y.o. male who presents for Follow-Up ([facility] DOI 9/11/24 rib fractures, Rm 16)      DOI: 9/11/24. JONAS: Patient reports he was stepping out of his truck and fell backward, striking his left side while at work. Patient presented to the emergency department following the injury at an outlying facility where a chest CT was performed, demonstrating minimally displaced left 7th-9th rib fractures. No pneumothorax or pleural effusion on imaging.      Today patient states that symptoms have slowly improving.  He continues to occasionally get a deep breathe out of nowhere and he feels like he gets extremely tired quickly, but no actual shortness of breath that he is aware of.  He states he is not paying attention to see if he is short of breath.  Continued  mild tenderness on the left lateral side of his rib cage.  He has some discomfort when he tries to take deep breath and sleeping.  He does not have any pain with cough.  He was provided with an incentive spirometer that he has been using every hour as instructed.  He is no longer using ibuprofen and Tylenol and the muscle relaxer.  He denies coughing up blood, shortness of breath, fever, chills, or wheezing.  He is currently tolerating light duty and has been doing a little bit more he states he is a little more sore at the end of the day but is not causing him severe pain like in the beginning of the injury.  Cardiothoracic surgery appointment is 12/3/24.  Plan of care discussed with patient. Nurse  present at this visit.   Review of Systems   Constitutional: Negative.    Respiratory:  Negative for cough, shortness of breath and wheezing.         Intermittent shortness of breath    Cardiovascular: Negative.    Musculoskeletal:  Positive for myalgias. Negative for joint pain.   Skin: Negative.    Neurological:  Negative for tingling, sensory change and weakness.   Psychiatric/Behavioral: Negative.         SOCHX: Works as  "a  at [employer]  FH: No pertinent family history to this problem       Objective:     /78   Pulse 74   Resp 16   Ht 1.803 m (5' 11\")   Wt 81.2 kg (179 lb)   SpO2 96%   BMI 24.97 kg/m²     Constitutional: Patient is in no acute distress. Appears well-developed and well-nourished.   Cardiovascular: Normal rate.    Pulmonary/Chest: Effort normal. No respiratory distress.   Neurological: Patient is alert and oriented to person, place, and time.   Skin: Skin is warm and dry.   Psychiatric: Normal mood and affect. Behavior is normal.     Pulmonary effort is normal.      Breath sounds: Examination of the right-lower field reveals decreased breath sounds. Examination of the left-lower field reveals decreased breath sounds. Decreased breath sounds present. No wheezing, rhonchi or rales.   Chest:   Chest wall: Tenderness present. No mass, lacerations, deformity, swelling, crepitus or edema.     Assessment/Plan:       1. Closed fracture of multiple ribs of left side with routine healing, subsequent encounter    Follow-up in 3 weeks  Continue with OTC Tylenol/ibuprofen if needed, ice/heat application, and gentle range of motion stretching as tolerated  Continue with incentive spirometer as recommended  Strict ED precautions discussed with patient          Differential diagnosis, natural history, supportive care, and indications for immediate follow-up discussed.    Approximately 25 minutes was spent in preparing for visit, obtaining history, exam and evaluation, patient counseling/education and post visit documentation/orders.    "

## 2024-12-13 ENCOUNTER — OCCUPATIONAL MEDICINE (OUTPATIENT)
Dept: OCCUPATIONAL MEDICINE | Facility: CLINIC | Age: 54
End: 2024-12-13
Payer: COMMERCIAL

## 2024-12-13 VITALS
BODY MASS INDEX: 26.04 KG/M2 | RESPIRATION RATE: 14 BRPM | TEMPERATURE: 98 F | WEIGHT: 186 LBS | HEART RATE: 89 BPM | HEIGHT: 71 IN | OXYGEN SATURATION: 94 % | DIASTOLIC BLOOD PRESSURE: 84 MMHG | SYSTOLIC BLOOD PRESSURE: 130 MMHG

## 2024-12-13 DIAGNOSIS — S22.42XD CLOSED FRACTURE OF MULTIPLE RIBS OF LEFT SIDE WITH ROUTINE HEALING, SUBSEQUENT ENCOUNTER: ICD-10-CM

## 2024-12-13 PROCEDURE — 3079F DIAST BP 80-89 MM HG: CPT | Performed by: NURSE PRACTITIONER

## 2024-12-13 PROCEDURE — 99213 OFFICE O/P EST LOW 20 MIN: CPT | Performed by: NURSE PRACTITIONER

## 2024-12-13 PROCEDURE — 3075F SYST BP GE 130 - 139MM HG: CPT | Performed by: NURSE PRACTITIONER

## 2024-12-13 ASSESSMENT — ENCOUNTER SYMPTOMS
COUGH: 0
HEMOPTYSIS: 0
SPUTUM PRODUCTION: 0
WHEEZING: 0
SHORTNESS OF BREATH: 0
CARDIOVASCULAR NEGATIVE: 1
MYALGIAS: 1
CONSTITUTIONAL NEGATIVE: 1

## 2024-12-13 NOTE — LETTER
PHYSICIAN’S AND CHIROPRACTIC PHYSICIAN'S   PROGRESS REPORT   CERTIFICATION OF DISABILITY Claim Number:     Social Security Number:    Patient’s Name: Moisés Cardenas Date of Injury: 9/11/2024   Employer:  [employer]  Name of MCO (if applicable):      Patient’s Job Description/Occupation: [employer]       Previous Injuries/Diseases/Surgeries Contributing to the Condition:         Diagnosis: (S22.42XD) Closed fracture of multiple ribs of left side with routine healing, subsequent encounter      Related to the Industrial Injury? Yes     Explain: DOI: 9/11/24. JONAS: Patient reports he was stepping out of his truck and fell backward, striking his left side while at work. Patient presented to the emergency department following the injury at an outlying facility where a chest CT was performed, demonstrating minimally displaced left 7th-9th rib fractures. No pneumothorax or pleural effusion on imaging.       Objective Medical Findings: Pulmonary effort is normal.  Unable to complete exam at this visit. Patient upset                 None - Discharged                         Stable  Yes                 Ratable  No     X   Generally Improved                         Condition Worsened               X   Condition Same  May Have Suffered a Permanent Disability No     Treatment Plan:    Follow-up in 4 weeks  Continue with OTC Tylenol/ibuprofen if needed, ice/heat application, and gentle range of motion stretching as tolerated  Continue with incentive spirometer as recommended  Strict ED precautions discussed with patien           No Change in Therapy                  PT/OT Prescribed                      Medication May be Used While Working        Case Management                          PT/OT Discontinued    Consultation    Further Diagnostic Studies:    Prescription(s) Consultation completed               Released to FULL DUTY /No Restrictions on (Date):       Certified TOTALLY TEMPORARILY DISABLED  (Indicate Dates) From:   To:    X  Released to RESTRICTED/Modified Duty on (Date): From: 12/13/2024 To:  01/10/2025  Restrictions Are:  Temporary      No Sitting    No Standing    No Pulling Other: Avoid lifting, pushing, pulling anything more than 25 pounds       No Bending at Waist     No Stooping     No Lifting        No Carrying     No Walking Lifting Restricted to (lbs.):  < or = to 25 pounds       No Pushing        No Climbing     No Reaching Above Shoulders       Date of Next Visit:   01/10/2025 @ 3:45 pm Date of this Exam: 12/13/2024 Physician/Chiropractic Physician Name: HENNA Rodarte Physician/Chiropractic Physician Signature:  Brian Garcia DO [facility]:  [address] [place] [address], [address] [place], [place] [address] - Telephone [phone] [place]:  [address] [place] [address], [address] [place], [place] [address] - Telephone [phone]    [email or web address]  D-39 (Rev. 10/24)

## 2024-12-13 NOTE — PROGRESS NOTES
"Subjective:     Moisés Cardenas is a 54 y.o. male who presents for Follow-Up ([facility] exam room 16/)  DOI: 9/11/24. JONAS: Patient reports he was stepping out of his truck and fell backward, striking his left side while at work. Patient presented to the emergency department following the injury at an outlying facility where a chest CT was performed, demonstrating minimally displaced left 7th-9th rib fractures. No pneumothorax or pleural effusion on imaging.      Today patient states that symptoms have slowly improving.  He continues to occasionally get a deep breathe out of nowhere and he feels like he gets extremely tired quickly, but no actual shortness of breath that he is aware of.  He states he was not able to tolerate walking around [employer] and he has \"tired of this\".  Some continued  mild tenderness on the left lateral side of his rib cage.  He has some discomfort when he tries to take deep breath and sleeping.  He does not have any pain with cough.  He was provided with an incentive spirometer that he has been using every hour as instructed.  He is no longer using ibuprofen and Tylenol and the muscle relaxer.  He denies coughing up blood, shortness of breath, fever, chills, or wheezing.  He is currently tolerating light duty and has been doing a little bit more.  Patient was seen by cardiothoracic surgery on 12/3/24, report reviewed.  Per report continue with OTC ibuprofen and surgeon feels that symptoms will resolve over time.  Plan of care discussed with patient.  Anticipate MMI at next visit.  Nurse  present at this visit.     Patient very agitated during this visit he states that he was seen by a pulmonologist not a thoracic surgeon and that the surgeon only spent 2 to 3 minutes with him and left the room came back and said he only spent about 45 seconds with him.  He states that there were findings on his CT scan from his initial visit to the ER.  Discussed with patient that the findings " "on the CT scan of aortic valvular calcifications with recommendation to follow-up with an echocardiograph he and possible elevation of the left hemidiaphragm were incidental findings and at this time unable to correlate to industrial causes.  Otherwise it did show minimally displaced left lateral ninth eighth and seventh lateral rib fractures.  Patient states \"I have a bump on my abdomen the size of my fist and no one taking me serious and sidestepping me at every turn and I am over it I want to be seen by someone that actually will take me seriously\".  Discussed with patient that repeat imaging 9/27/27 and 11/7/24 has demonstrated that the ribs are beginning to heal and no abnormal lumps or deformities were identified.  Patient demanding a CT scan which he never had and a echocardiograph which is not indicated given the nature and mechanism of injury.  Patient began to get loud raising his voice and the visit was ended at this time.  The information was discussed with the nurse .      Review of Systems   Constitutional: Negative.    Respiratory:  Negative for cough, hemoptysis, sputum production, shortness of breath and wheezing.    Cardiovascular: Negative.    Musculoskeletal:  Positive for myalgias.   Skin: Negative.    Psychiatric/Behavioral:          Aggravated        SOCHX: Works as a  at [employer]  FH: No pertinent family history to this problem       Objective:     /84   Pulse 89   Temp 36.7 °C (98 °F) (Temporal)   Resp 14   Ht 1.803 m (5' 11\")   Wt 84.4 kg (186 lb)   SpO2 94%   BMI 25.94 kg/m²     Constitutional: Patient is in no acute distress. Appears well-developed and well-nourished.   Cardiovascular: Normal rate.    Pulmonary/Chest: Effort normal. No respiratory distress.   Neurological: Patient is alert and oriented to person, place, and time.   Skin: Skin is warm and dry.   Psychiatric: Normal mood and affect. Behavior is normal.     Pulmonary effort is " normal.  Unable to complete exam at this visit. Patient upset            Assessment/Plan:       1. Closed fracture of multiple ribs of left side with routine healing, subsequent encounter  - CT-ABDOMEN-PELVIS W/O; Future  - Referral to Radiology    Follow-up in 4 weeks  Continue with OTC Tylenol/ibuprofen if needed, ice/heat application, and gentle range of motion stretching as tolerated  Continue with incentive spirometer as recommended  Strict ED precautions discussed with patien      Differential diagnosis, natural history, supportive care, and indications for immediate follow-up discussed.    Approximately 25 minutes was spent in preparing for visit, obtaining history, exam and evaluation, patient counseling/education and post visit documentation/orders.

## 2025-01-14 ENCOUNTER — OCCUPATIONAL MEDICINE (OUTPATIENT)
Dept: OCCUPATIONAL MEDICINE | Facility: CLINIC | Age: 55
End: 2025-01-14
Payer: COMMERCIAL

## 2025-01-14 VITALS
DIASTOLIC BLOOD PRESSURE: 80 MMHG | BODY MASS INDEX: 26.46 KG/M2 | SYSTOLIC BLOOD PRESSURE: 132 MMHG | WEIGHT: 189 LBS | HEIGHT: 71 IN | HEART RATE: 84 BPM | OXYGEN SATURATION: 97 % | RESPIRATION RATE: 18 BRPM

## 2025-01-14 DIAGNOSIS — S22.42XD CLOSED FRACTURE OF MULTIPLE RIBS OF LEFT SIDE WITH ROUTINE HEALING, SUBSEQUENT ENCOUNTER: ICD-10-CM

## 2025-01-14 PROCEDURE — 99213 OFFICE O/P EST LOW 20 MIN: CPT | Performed by: PREVENTIVE MEDICINE

## 2025-01-14 NOTE — LETTER
PHYSICIAN’S AND CHIROPRACTIC PHYSICIAN'S   PROGRESS REPORT   CERTIFICATION OF DISABILITY Claim Number:     Social Security Number:    Patient’s Name: Moisés Cardenas Date of Injury: 9/11/2024   Employer:  [employer] Name of MCO (if applicable):      Patient’s Job Description/Occupation: [employer]       Previous Injuries/Diseases/Surgeries Contributing to the Condition:         Diagnosis: (S22.42XD) Closed fracture of multiple ribs of left side with routine healing, subsequent encounter      Related to the Industrial Injury? Yes     Explain:        Objective Medical Findings: Chest Wall: No gross deform.  Clear to auscultation all fields.  Good respiratory effort.    CT Abdomen/Pelvis: No abnormality noted on report.  Very minimal left hemidiaphragm remaining per my read.      X   None - Discharged                         Stable  Yes                 Ratable  No     X   Generally Improved                         Condition Worsened                  Condition Same  May Have Suffered a Permanent Disability No     Treatment Plan:    Discussed natural course and history of hemidiaphragm and rib fractures.  Expect gradual resolution of remaining symptoms.  Will release from care at this point.  If symptoms worsen return to clinic.         No Change in Therapy                  PT/OT Prescribed                      Medication May be Used While Working        Case Management                          PT/OT Discontinued    Consultation    Further Diagnostic Studies:    Prescription(s)               X  Released to FULL DUTY /No Restrictions on (Date):  1/14/2025    Certified TOTALLY TEMPORARILY DISABLED (Indicate Dates) From:   To:      Released to RESTRICTED/Modified Duty on (Date): From:   To:    Restrictions Are:         No Sitting    No Standing    No Pulling Other:         No Bending at Waist     No Stooping     No Lifting        No Carrying     No Walking Lifting Restricted to (lbs.):          No  Pushing        No Climbing     No Reaching Above Shoulders       Date of Next Visit:   Release from care Date of this Exam: 1/14/2025 Physician/Chiropractic Physician Name: Brian Garcia D.O. Physician/Chiropractic Physician Signature:  Brian Garcia DO MPH     [place]:  [facility]  [facility], [address] [place], [place] [address] - Telephone [phone] [place]:  [address] [place] [address], [address] [place], [place] [address] - Telephone [phone]    [email or web address]  D-39 (Rev. 10/24)

## 2025-01-14 NOTE — PROGRESS NOTES
"Subjective:     Moisés Cardenas is a 54 y.o. male who presents for Follow-Up (WC DOI 9/11/24 rib fractures, rm 16)    DOI: 9/11/24. JONAS: Patient reports he was stepping out of his truck and fell backward, striking his left side while at work. Patient presented to the emergency department following the injury at an outlying facility where a chest CT was performed, demonstrating minimally displaced left 7th-9th rib fractures. No pneumothorax or pleural effusion on imaging.      1/14/2025: Patient states that pain from the left rib fractures are mild at this point.  He is able to do his job without much difficulty.  He states he still feels occasionally short of breath.  He states he still notes pain right under the ribs on the left side.  He is also concerned about the left hemidiaphragm diagnosis on the original CT scan done at [facility].  He states he had a few episodes of bowel incontinence since the injury as well.    ROS    SOCHX: Works as a  at [employer]  FH: No pertinent family history to this problem.       Objective:     /80   Pulse 84   Resp 18   Ht 1.803 m (5' 11\")   Wt 85.7 kg (189 lb)   SpO2 97%   BMI 26.36 kg/m²     Constitutional: Patient is in no acute distress. Appears well-developed and well-nourished.   Cardiovascular: Normal rate.    Pulmonary/Chest: Effort normal. No respiratory distress.   Neurological: Patient is alert and oriented to person, place, and time.   Skin: Skin is warm and dry.   Psychiatric: Normal mood and affect. Behavior is normal.     Chest Wall: No gross deform.  Clear to auscultation all fields.  Good respiratory effort.    CT Abdomen/Pelvis: No abnormality noted on report.  Very minimal left hemidiaphragm remaining per my read.    Assessment/Plan:     1. Closed fracture of multiple ribs of left side with routine healing, subsequent encounter    Had extensive discussion in regards to work comp, hemidiaphragm, bowel incontinence and rib " fractures.  Explained that left hemidiaphragm likely unrelated to the injury but that it appears to be resolved on CT scan.  Also advised that bowel incontinence is likely unrelated to the injury as well.  Discussed natural course and history of hemidiaphragm and rib fractures.  Expect gradual resolution of remaining symptoms.  Will release from care at this point.  If symptoms worsen return to clinic.      Work Status: Full Duty - see [id number] or other state/federal worker's compensation forms for specific restrictions if applicable  Follow up as needed    Differential diagnosis, natural history, supportive care, and indications for immediate follow-up discussed.

## 2025-03-03 ENCOUNTER — HOSPITAL ENCOUNTER (OUTPATIENT)
Dept: LAB | Facility: MEDICAL CENTER | Age: 55
End: 2025-03-03
Attending: NURSE PRACTITIONER
Payer: COMMERCIAL

## 2025-03-03 LAB
ALBUMIN SERPL BCP-MCNC: 4.4 G/DL (ref 3.2–4.9)
ALBUMIN/GLOB SERPL: 1.5 G/DL
ALP SERPL-CCNC: 72 U/L (ref 30–99)
ALT SERPL-CCNC: 15 U/L (ref 2–50)
ANION GAP SERPL CALC-SCNC: 11 MMOL/L (ref 7–16)
AST SERPL-CCNC: 22 U/L (ref 12–45)
BASOPHILS # BLD AUTO: 0.6 % (ref 0–1.8)
BASOPHILS # BLD: 0.03 K/UL (ref 0–0.12)
BILIRUB SERPL-MCNC: 0.5 MG/DL (ref 0.1–1.5)
BUN SERPL-MCNC: 14 MG/DL (ref 8–22)
CALCIUM ALBUM COR SERPL-MCNC: 9.3 MG/DL (ref 8.5–10.5)
CALCIUM SERPL-MCNC: 9.6 MG/DL (ref 8.5–10.5)
CHLORIDE SERPL-SCNC: 106 MMOL/L (ref 96–112)
CO2 SERPL-SCNC: 25 MMOL/L (ref 20–33)
CREAT SERPL-MCNC: 0.92 MG/DL (ref 0.5–1.4)
EOSINOPHIL # BLD AUTO: 0.04 K/UL (ref 0–0.51)
EOSINOPHIL NFR BLD: 0.8 % (ref 0–6.9)
ERYTHROCYTE [DISTWIDTH] IN BLOOD BY AUTOMATED COUNT: 43.6 FL (ref 35.9–50)
GFR SERPLBLD CREATININE-BSD FMLA CKD-EPI: 99 ML/MIN/1.73 M 2
GLOBULIN SER CALC-MCNC: 3 G/DL (ref 1.9–3.5)
GLUCOSE SERPL-MCNC: 85 MG/DL (ref 65–99)
HAV IGM SERPL QL IA: NORMAL
HBV CORE AB SERPL QL IA: NONREACTIVE
HBV SURFACE AB SERPL IA-ACNC: <3.5 MIU/ML (ref 0–10)
HBV SURFACE AG SER QL: NORMAL
HCT VFR BLD AUTO: 44.1 % (ref 42–52)
HGB BLD-MCNC: 14.4 G/DL (ref 14–18)
IMM GRANULOCYTES # BLD AUTO: 0.01 K/UL (ref 0–0.11)
IMM GRANULOCYTES NFR BLD AUTO: 0.2 % (ref 0–0.9)
LYMPHOCYTES # BLD AUTO: 1.54 K/UL (ref 1–4.8)
LYMPHOCYTES NFR BLD: 29.8 % (ref 22–41)
MCH RBC QN AUTO: 31 PG (ref 27–33)
MCHC RBC AUTO-ENTMCNC: 32.7 G/DL (ref 32.3–36.5)
MCV RBC AUTO: 94.8 FL (ref 81.4–97.8)
MONOCYTES # BLD AUTO: 0.42 K/UL (ref 0–0.85)
MONOCYTES NFR BLD AUTO: 8.1 % (ref 0–13.4)
NEUTROPHILS # BLD AUTO: 3.13 K/UL (ref 1.82–7.42)
NEUTROPHILS NFR BLD: 60.5 % (ref 44–72)
NRBC # BLD AUTO: 0 K/UL
NRBC BLD-RTO: 0 /100 WBC (ref 0–0.2)
PLATELET # BLD AUTO: 165 K/UL (ref 164–446)
PMV BLD AUTO: 10.7 FL (ref 9–12.9)
POTASSIUM SERPL-SCNC: 4.7 MMOL/L (ref 3.6–5.5)
PROT SERPL-MCNC: 7.4 G/DL (ref 6–8.2)
RBC # BLD AUTO: 4.65 M/UL (ref 4.7–6.1)
SODIUM SERPL-SCNC: 142 MMOL/L (ref 135–145)
WBC # BLD AUTO: 5.2 K/UL (ref 4.8–10.8)

## 2025-03-03 PROCEDURE — 86704 HEP B CORE ANTIBODY TOTAL: CPT

## 2025-03-03 PROCEDURE — 80053 COMPREHEN METABOLIC PANEL: CPT

## 2025-03-03 PROCEDURE — 87340 HEPATITIS B SURFACE AG IA: CPT

## 2025-03-03 PROCEDURE — 36415 COLL VENOUS BLD VENIPUNCTURE: CPT

## 2025-03-03 PROCEDURE — 87522 HEPATITIS C REVRS TRNSCRPJ: CPT

## 2025-03-03 PROCEDURE — 86709 HEPATITIS A IGM ANTIBODY: CPT

## 2025-03-03 PROCEDURE — 86708 HEPATITIS A ANTIBODY: CPT

## 2025-03-03 PROCEDURE — 85025 COMPLETE CBC W/AUTO DIFF WBC: CPT

## 2025-03-03 PROCEDURE — 86706 HEP B SURFACE ANTIBODY: CPT

## 2025-03-05 LAB — HAV AB SER QL IA: POSITIVE

## 2025-03-06 LAB
HCV RNA SERPL NAA+PROBE-ACNC: NOT DETECTED IU/ML
HCV RNA SERPL NAA+PROBE-LOG IU: NOT DETECTED LOG IU/ML
HCV RNA SERPL QL NAA+PROBE: NOT DETECTED